# Patient Record
Sex: FEMALE | Race: WHITE | NOT HISPANIC OR LATINO | Employment: FULL TIME | URBAN - METROPOLITAN AREA
[De-identification: names, ages, dates, MRNs, and addresses within clinical notes are randomized per-mention and may not be internally consistent; named-entity substitution may affect disease eponyms.]

---

## 2019-06-05 ENCOUNTER — OFFICE VISIT (OUTPATIENT)
Dept: OBSTETRICS AND GYNECOLOGY | Facility: CLINIC | Age: 19
End: 2019-06-05
Payer: COMMERCIAL

## 2019-06-05 VITALS — DIASTOLIC BLOOD PRESSURE: 73 MMHG | WEIGHT: 166 LBS | SYSTOLIC BLOOD PRESSURE: 116 MMHG | HEART RATE: 71 BPM

## 2019-06-05 DIAGNOSIS — N92.6 IRREGULAR MENSTRUAL CYCLE: Primary | ICD-10-CM

## 2019-06-05 DIAGNOSIS — N76.0 BACTERIAL VAGINOSIS: ICD-10-CM

## 2019-06-05 DIAGNOSIS — B96.89 BACTERIAL VAGINOSIS: ICD-10-CM

## 2019-06-05 PROCEDURE — 99213 PR OFFICE/OUTPT VISIT, EST, LEVL III, 20-29 MIN: ICD-10-PCS | Mod: S$GLB,,, | Performed by: OBSTETRICS & GYNECOLOGY

## 2019-06-05 PROCEDURE — 99213 OFFICE O/P EST LOW 20 MIN: CPT | Mod: S$GLB,,, | Performed by: OBSTETRICS & GYNECOLOGY

## 2019-06-05 RX ORDER — METRONIDAZOLE 500 MG/1
500 TABLET ORAL EVERY 12 HOURS
Qty: 30 TABLET | Refills: 0 | Status: SHIPPED | OUTPATIENT
Start: 2019-06-05 | End: 2019-06-15

## 2019-06-05 NOTE — PROGRESS NOTES
Subjective:       Patient ID: Rachel Andino is a 18 y.o. female.    Chief Complaint:  Vaginal Bleeding      History of Present Illness  Vag odor and had 3 periods  thsi month.  Light only lasted a day or two.  Has nexplanon.  Usually has monthly cycle.     No other complaitns.      Review of Systems  Review of Systems   Constitutional: Negative for chills and fever.   Respiratory: Negative for shortness of breath.    Cardiovascular: Negative for chest pain.   Gastrointestinal: Negative for abdominal pain, blood in stool, constipation, diarrhea, nausea, vomiting and reflux.   Genitourinary: Positive for menstrual problem, vaginal bleeding and vaginal discharge. Negative for dysmenorrhea, dyspareunia, dysuria, hematuria, hot flashes, menorrhagia, pelvic pain, postcoital bleeding and vaginal dryness.   Musculoskeletal: Negative for arthralgias and joint swelling.   Integumentary:  Negative for rash and hair changes.   Psychiatric/Behavioral: Negative for depression. The patient is not nervous/anxious.            Objective:    Physical Exam:   Constitutional: She appears well-developed and well-nourished. No distress.    HENT:   Head: Normocephalic and atraumatic.    Eyes: Conjunctivae and EOM are normal.    Neck: No tracheal deviation present. No thyromegaly present.    Cardiovascular: Exam reveals no clubbing, no cyanosis and no edema.     Pulmonary/Chest: Effort normal. No respiratory distress.        Abdominal: Soft. She exhibits no distension and no mass. There is no tenderness. There is no rebound and no guarding. No hernia.     Genitourinary: Rectum normal, vagina normal and uterus normal. Pelvic exam was performed with patient supine. There is no rash, tenderness, lesion or injury on the right labia. There is no rash, tenderness, lesion or injury on the left labia. Cervix is normal. Right adnexum displays no mass, no tenderness and no fullness. Left adnexum displays no mass, no tenderness and no fullness.                 Skin: She is not diaphoretic. No cyanosis. Nails show no clubbing.         wet prep +++ clue cells  Assessment:        1. Irregular menstrual cycle    2. Bacterial vaginosis               Plan:      Flagyl  If cycle prob cont- will go back to pills

## 2019-11-27 ENCOUNTER — OFFICE VISIT (OUTPATIENT)
Dept: OBSTETRICS AND GYNECOLOGY | Facility: CLINIC | Age: 19
End: 2019-11-27
Payer: COMMERCIAL

## 2019-11-27 VITALS — WEIGHT: 178 LBS | DIASTOLIC BLOOD PRESSURE: 78 MMHG | SYSTOLIC BLOOD PRESSURE: 123 MMHG | HEART RATE: 80 BPM

## 2019-11-27 DIAGNOSIS — Z71.1 CONCERN ABOUT STD IN FEMALE WITHOUT DIAGNOSIS: Primary | ICD-10-CM

## 2019-11-27 PROCEDURE — 99213 PR OFFICE/OUTPT VISIT, EST, LEVL III, 20-29 MIN: ICD-10-PCS | Mod: S$GLB,,, | Performed by: OBSTETRICS & GYNECOLOGY

## 2019-11-27 PROCEDURE — 99213 OFFICE O/P EST LOW 20 MIN: CPT | Mod: S$GLB,,, | Performed by: OBSTETRICS & GYNECOLOGY

## 2019-11-27 NOTE — PROGRESS NOTES
Subjective:       Patient ID: Rachel Andino is a 19 y.o. female.    Chief Complaint:  STD CHECK      History of Present Illness  HPI  Patient here with no symptoms.  She said her boyfriend told her he was having some, symptoms did not tell her what.  She is unsure of anything to do with this symptoms.  She does have irregular bleeding but she has Nexplanon in place.  She has no abnormal discharge abdominal pain or any other issues at this time    GYN & OB History  No LMP recorded (lmp unknown). Patient has had an implant.   Date of Last Pap: No result found    OB History   No data available       Review of Systems  Review of Systems   Constitutional: Negative for chills and fever.   Respiratory: Negative for shortness of breath.    Cardiovascular: Negative for chest pain.   Gastrointestinal: Negative for abdominal pain, blood in stool, constipation, diarrhea, nausea, vomiting and reflux.   Genitourinary: Negative for dysmenorrhea, dyspareunia, dysuria, hematuria, hot flashes, menorrhagia, menstrual problem, pelvic pain, vaginal bleeding, vaginal discharge, postcoital bleeding and vaginal dryness.   Musculoskeletal: Negative for arthralgias and joint swelling.   Integumentary:  Negative for rash and hair changes.   Psychiatric/Behavioral: Negative for depression. The patient is not nervous/anxious.            Objective:    Physical Exam:   Constitutional: She appears well-developed and well-nourished. No distress.    HENT:   Head: Normocephalic and atraumatic.    Eyes: Conjunctivae and EOM are normal.    Neck: No tracheal deviation present. No thyromegaly present.    Cardiovascular: Exam reveals no clubbing, no cyanosis and no edema.     Pulmonary/Chest: Effort normal. No respiratory distress.        Abdominal: Soft. She exhibits no distension and no mass. There is no tenderness. There is no rebound and no guarding. No hernia.     Genitourinary: Rectum normal, vagina normal and uterus normal. Pelvic exam was  performed with patient supine. There is no rash, tenderness, lesion or injury on the right labia. There is no rash, tenderness, lesion or injury on the left labia. Cervix is normal. Right adnexum displays no mass, no tenderness and no fullness. Left adnexum displays no mass, no tenderness and no fullness.                Skin: She is not diaphoretic. No cyanosis. Nails show no clubbing.         Wet prep was completely normal. No white cells  Assessment:        1. Concern about STD in female without diagnosis               Plan:      Reassurance  rtc if sx develop or for annual

## 2021-01-25 ENCOUNTER — OFFICE VISIT (OUTPATIENT)
Dept: OBSTETRICS AND GYNECOLOGY | Facility: CLINIC | Age: 21
End: 2021-01-25
Payer: COMMERCIAL

## 2021-01-25 VITALS — WEIGHT: 175 LBS | DIASTOLIC BLOOD PRESSURE: 81 MMHG | SYSTOLIC BLOOD PRESSURE: 119 MMHG | HEART RATE: 82 BPM

## 2021-01-25 DIAGNOSIS — Z01.419 ENCOUNTER FOR GYNECOLOGICAL EXAMINATION WITHOUT ABNORMAL FINDING: Primary | ICD-10-CM

## 2021-01-25 PROCEDURE — 11982 REMOVE DRUG IMPLANT DEVICE: CPT | Mod: S$GLB,,, | Performed by: OBSTETRICS & GYNECOLOGY

## 2021-01-25 PROCEDURE — 99395 PREV VISIT EST AGE 18-39: CPT | Mod: 25,S$GLB,, | Performed by: OBSTETRICS & GYNECOLOGY

## 2021-01-25 PROCEDURE — 11982 PR REMOVAL DRUG IMPLANT DEVICE: ICD-10-PCS | Mod: S$GLB,,, | Performed by: OBSTETRICS & GYNECOLOGY

## 2021-01-25 PROCEDURE — 99395 PR PREVENTIVE VISIT,EST,18-39: ICD-10-PCS | Mod: 25,S$GLB,, | Performed by: OBSTETRICS & GYNECOLOGY

## 2021-01-25 RX ORDER — ETONOGESTREL 68 MG/1
IMPLANT SUBCUTANEOUS
COMMUNITY

## 2021-01-27 LAB
CHLAMYDIA: NEGATIVE
GONORRHEA: NEGATIVE
SOURCE: NORMAL
SOURCE: NORMAL
TRICHOMONAS AMPLIFIED: NEGATIVE

## 2021-03-11 ENCOUNTER — OFFICE VISIT (OUTPATIENT)
Dept: OBSTETRICS AND GYNECOLOGY | Facility: CLINIC | Age: 21
End: 2021-03-11
Payer: COMMERCIAL

## 2021-03-11 VITALS — SYSTOLIC BLOOD PRESSURE: 112 MMHG | HEART RATE: 87 BPM | WEIGHT: 174 LBS | DIASTOLIC BLOOD PRESSURE: 72 MMHG

## 2021-03-11 DIAGNOSIS — Z30.46 ENCOUNTER FOR REMOVAL AND REINSERTION OF NEXPLANON: Primary | ICD-10-CM

## 2021-03-11 PROCEDURE — 11983 PR REMOVAL W/ REINSERT DRUG IMPLANT DEVICE: ICD-10-PCS | Mod: S$GLB,,, | Performed by: OBSTETRICS & GYNECOLOGY

## 2021-03-11 PROCEDURE — 99499 UNLISTED E&M SERVICE: CPT | Mod: S$GLB,,, | Performed by: OBSTETRICS & GYNECOLOGY

## 2021-03-11 PROCEDURE — 96372 PR INJECTION,THERAP/PROPH/DIAG2ST, IM OR SUBCUT: ICD-10-PCS | Mod: S$GLB,,, | Performed by: OBSTETRICS & GYNECOLOGY

## 2021-03-11 PROCEDURE — 99499 NO LOS: ICD-10-PCS | Mod: S$GLB,,, | Performed by: OBSTETRICS & GYNECOLOGY

## 2021-03-11 PROCEDURE — 96372 THER/PROPH/DIAG INJ SC/IM: CPT | Mod: S$GLB,,, | Performed by: OBSTETRICS & GYNECOLOGY

## 2021-03-11 PROCEDURE — 11983 REMOVE/INSERT DRUG IMPLANT: CPT | Mod: S$GLB,,, | Performed by: OBSTETRICS & GYNECOLOGY

## 2021-03-29 ENCOUNTER — PATIENT MESSAGE (OUTPATIENT)
Dept: OBSTETRICS AND GYNECOLOGY | Facility: CLINIC | Age: 21
End: 2021-03-29

## 2021-03-30 ENCOUNTER — PATIENT MESSAGE (OUTPATIENT)
Dept: OBSTETRICS AND GYNECOLOGY | Facility: CLINIC | Age: 21
End: 2021-03-30

## 2021-05-12 ENCOUNTER — PATIENT MESSAGE (OUTPATIENT)
Dept: RESEARCH | Facility: HOSPITAL | Age: 21
End: 2021-05-12

## 2021-07-01 ENCOUNTER — PATIENT MESSAGE (OUTPATIENT)
Dept: ADMINISTRATIVE | Facility: OTHER | Age: 21
End: 2021-07-01

## 2022-01-27 ENCOUNTER — OFFICE VISIT (OUTPATIENT)
Dept: OBSTETRICS AND GYNECOLOGY | Facility: CLINIC | Age: 22
End: 2022-01-27
Payer: COMMERCIAL

## 2022-01-27 VITALS — DIASTOLIC BLOOD PRESSURE: 76 MMHG | SYSTOLIC BLOOD PRESSURE: 124 MMHG | HEART RATE: 86 BPM | WEIGHT: 141 LBS

## 2022-01-27 DIAGNOSIS — Z01.419 ENCOUNTER FOR GYNECOLOGICAL EXAMINATION WITHOUT ABNORMAL FINDING: Primary | ICD-10-CM

## 2022-01-27 PROCEDURE — 99395 PREV VISIT EST AGE 18-39: CPT | Mod: S$GLB,,, | Performed by: OBSTETRICS & GYNECOLOGY

## 2022-01-27 PROCEDURE — 3078F PR MOST RECENT DIASTOLIC BLOOD PRESSURE < 80 MM HG: ICD-10-PCS | Mod: CPTII,S$GLB,, | Performed by: OBSTETRICS & GYNECOLOGY

## 2022-01-27 PROCEDURE — 3074F SYST BP LT 130 MM HG: CPT | Mod: CPTII,S$GLB,, | Performed by: OBSTETRICS & GYNECOLOGY

## 2022-01-27 PROCEDURE — 1159F PR MEDICATION LIST DOCUMENTED IN MEDICAL RECORD: ICD-10-PCS | Mod: CPTII,S$GLB,, | Performed by: OBSTETRICS & GYNECOLOGY

## 2022-01-27 PROCEDURE — 3074F PR MOST RECENT SYSTOLIC BLOOD PRESSURE < 130 MM HG: ICD-10-PCS | Mod: CPTII,S$GLB,, | Performed by: OBSTETRICS & GYNECOLOGY

## 2022-01-27 PROCEDURE — 3078F DIAST BP <80 MM HG: CPT | Mod: CPTII,S$GLB,, | Performed by: OBSTETRICS & GYNECOLOGY

## 2022-01-27 PROCEDURE — 99395 PR PREVENTIVE VISIT,EST,18-39: ICD-10-PCS | Mod: S$GLB,,, | Performed by: OBSTETRICS & GYNECOLOGY

## 2022-01-27 PROCEDURE — 1159F MED LIST DOCD IN RCRD: CPT | Mod: CPTII,S$GLB,, | Performed by: OBSTETRICS & GYNECOLOGY

## 2022-01-27 RX ORDER — DEXTROAMPHETAMINE SACCHARATE, AMPHETAMINE ASPARTATE, DEXTROAMPHETAMINE SULFATE AND AMPHETAMINE SULFATE 5; 5; 5; 5 MG/1; MG/1; MG/1; MG/1
1 TABLET ORAL 2 TIMES DAILY
COMMUNITY
Start: 2021-12-28

## 2022-01-27 NOTE — PROGRESS NOTES
Subjective:       Patient ID: Rachel Andino is a 21 y.o. female.    Chief Complaint:  Well Woman      History of Present Illness  Pt here for gyn annual.  History and past labs reviewed with patient.    Complaints noen      Review of Systems  Review of Systems   Constitutional: Negative for chills and fever.   Respiratory: Negative for shortness of breath.    Cardiovascular: Negative for chest pain.   Gastrointestinal: Negative for abdominal pain, blood in stool, constipation, diarrhea, nausea, vomiting and reflux.   Genitourinary: Negative for dysmenorrhea, dyspareunia, dysuria, hematuria, hot flashes, menorrhagia, menstrual problem, pelvic pain, vaginal bleeding, vaginal discharge, postcoital bleeding and vaginal dryness.   Musculoskeletal: Negative for arthralgias and joint swelling.   Integumentary:  Negative for rash, hair changes, breast mass, nipple discharge and breast skin changes.   Psychiatric/Behavioral: Negative for depression. The patient is not nervous/anxious.    Breast: Negative for asymmetry, lump, mass, nipple discharge and skin changes          Objective:     Vitals:    01/27/22 1310   BP: 124/76   Pulse: 86   Weight: 64 kg (141 lb)       Physical Exam:   Constitutional: She appears well-developed and well-nourished. No distress.    HENT:   Head: Normocephalic and atraumatic.    Eyes: Conjunctivae and EOM are normal.    Neck: No tracheal deviation present. No thyromegaly present.    Cardiovascular: Exam reveals no clubbing, no cyanosis and no edema.     Pulmonary/Chest: Effort normal. No respiratory distress.        Abdominal: Soft. She exhibits no distension and no mass. There is no abdominal tenderness. There is no rebound and no guarding. No hernia.     Genitourinary:    Vagina, uterus and rectum normal.      Pelvic exam was performed with patient supine.   There is no rash, tenderness, lesion or injury on the right labia. There is no rash, tenderness, lesion or injury on the left labia.  Cervix is normal. Right adnexum displays no mass, no tenderness and no fullness. Left adnexum displays no mass, no tenderness and no fullness.                Skin: She is not diaphoretic. No cyanosis. Nails show no clubbing.             Assessment:        1. Encounter for gynecological examination without abnormal finding               Plan:      nayeli giang  Pap utd

## 2022-01-28 LAB
CHLAMYDIA: NEGATIVE
GONORRHEA: NEGATIVE
SOURCE: NORMAL

## 2022-01-31 LAB
SOURCE: NORMAL
TRICHOMONAS AMPLIFIED: NEGATIVE

## 2022-02-04 ENCOUNTER — PATIENT MESSAGE (OUTPATIENT)
Dept: OBSTETRICS AND GYNECOLOGY | Facility: CLINIC | Age: 22
End: 2022-02-04
Payer: COMMERCIAL

## 2022-02-04 DIAGNOSIS — B37.9 YEAST INFECTION: Primary | ICD-10-CM

## 2022-02-04 RX ORDER — FLUCONAZOLE 100 MG/1
150 TABLET ORAL ONCE
Qty: 1 TABLET | Refills: 0 | Status: SHIPPED | OUTPATIENT
Start: 2022-02-04 | End: 2022-02-04

## 2022-02-16 ENCOUNTER — PATIENT MESSAGE (OUTPATIENT)
Dept: OBSTETRICS AND GYNECOLOGY | Facility: CLINIC | Age: 22
End: 2022-02-16
Payer: COMMERCIAL

## 2022-04-26 ENCOUNTER — PATIENT MESSAGE (OUTPATIENT)
Dept: OBSTETRICS AND GYNECOLOGY | Facility: CLINIC | Age: 22
End: 2022-04-26
Payer: COMMERCIAL

## 2024-09-27 ENCOUNTER — HOSPITAL ENCOUNTER (INPATIENT)
Facility: HOSPITAL | Age: 24
LOS: 1 days | Discharge: HOME | DRG: 310 | End: 2024-09-28
Attending: EMERGENCY MEDICINE | Admitting: INTERNAL MEDICINE
Payer: COMMERCIAL

## 2024-09-27 DIAGNOSIS — R07.9 CHEST PAIN: ICD-10-CM

## 2024-09-27 DIAGNOSIS — R00.2 PALPITATIONS: ICD-10-CM

## 2024-09-27 DIAGNOSIS — F90.2 ATTENTION DEFICIT HYPERACTIVITY DISORDER (ADHD), COMBINED TYPE: ICD-10-CM

## 2024-09-27 DIAGNOSIS — I47.10 SVT (SUPRAVENTRICULAR TACHYCARDIA): Primary | ICD-10-CM

## 2024-09-27 DIAGNOSIS — I48.0 PAF (PAROXYSMAL ATRIAL FIBRILLATION): ICD-10-CM

## 2024-09-27 DIAGNOSIS — I48.91 NEW ONSET A-FIB: ICD-10-CM

## 2024-09-27 DIAGNOSIS — I48.91 A-FIB: ICD-10-CM

## 2024-09-27 LAB
ALBUMIN SERPL BCP-MCNC: 4.2 G/DL (ref 3.5–5.2)
ALP SERPL-CCNC: 53 U/L (ref 55–135)
ALT SERPL W/O P-5'-P-CCNC: 11 U/L (ref 10–44)
ANION GAP SERPL CALC-SCNC: 10 MMOL/L (ref 8–16)
AST SERPL-CCNC: 16 U/L (ref 10–40)
BASOPHILS # BLD AUTO: 0.07 K/UL (ref 0–0.2)
BASOPHILS NFR BLD: 0.6 % (ref 0–1.9)
BILIRUB SERPL-MCNC: 0.3 MG/DL (ref 0.1–1)
BNP SERPL-MCNC: 26 PG/ML (ref 0–99)
BUN SERPL-MCNC: 11 MG/DL (ref 6–20)
CALCIUM SERPL-MCNC: 9.7 MG/DL (ref 8.7–10.5)
CHLORIDE SERPL-SCNC: 107 MMOL/L (ref 95–110)
CO2 SERPL-SCNC: 25 MMOL/L (ref 23–29)
CREAT SERPL-MCNC: 0.8 MG/DL (ref 0.5–1.4)
DIFFERENTIAL METHOD BLD: ABNORMAL
EOSINOPHIL # BLD AUTO: 0.2 K/UL (ref 0–0.5)
EOSINOPHIL NFR BLD: 1.9 % (ref 0–8)
ERYTHROCYTE [DISTWIDTH] IN BLOOD BY AUTOMATED COUNT: 12.5 % (ref 11.5–14.5)
EST. GFR  (NO RACE VARIABLE): >60 ML/MIN/1.73 M^2
GLUCOSE SERPL-MCNC: 95 MG/DL (ref 70–110)
HCT VFR BLD AUTO: 43.2 % (ref 37–48.5)
HGB BLD-MCNC: 14.4 G/DL (ref 12–16)
IMM GRANULOCYTES # BLD AUTO: 0.02 K/UL (ref 0–0.04)
IMM GRANULOCYTES NFR BLD AUTO: 0.2 % (ref 0–0.5)
LYMPHOCYTES # BLD AUTO: 4.9 K/UL (ref 1–4.8)
LYMPHOCYTES NFR BLD: 43.4 % (ref 18–48)
MCH RBC QN AUTO: 30 PG (ref 27–31)
MCHC RBC AUTO-ENTMCNC: 33.3 G/DL (ref 32–36)
MCV RBC AUTO: 90 FL (ref 82–98)
MONOCYTES # BLD AUTO: 0.5 K/UL (ref 0.3–1)
MONOCYTES NFR BLD: 4.6 % (ref 4–15)
NEUTROPHILS # BLD AUTO: 5.6 K/UL (ref 1.8–7.7)
NEUTROPHILS NFR BLD: 49.3 % (ref 38–73)
NRBC BLD-RTO: 0 /100 WBC
PLATELET # BLD AUTO: 326 K/UL (ref 150–450)
PMV BLD AUTO: 10.2 FL (ref 9.2–12.9)
POTASSIUM SERPL-SCNC: 4 MMOL/L (ref 3.5–5.1)
PROT SERPL-MCNC: 7.2 G/DL (ref 6–8.4)
RBC # BLD AUTO: 4.8 M/UL (ref 4–5.4)
SODIUM SERPL-SCNC: 142 MMOL/L (ref 136–145)
TROPONIN I SERPL DL<=0.01 NG/ML-MCNC: <0.006 NG/ML (ref 0–0.03)
WBC # BLD AUTO: 11.34 K/UL (ref 3.9–12.7)

## 2024-09-27 PROCEDURE — 93005 ELECTROCARDIOGRAM TRACING: CPT

## 2024-09-27 PROCEDURE — 80053 COMPREHEN METABOLIC PANEL: CPT | Performed by: EMERGENCY MEDICINE

## 2024-09-27 PROCEDURE — 83880 ASSAY OF NATRIURETIC PEPTIDE: CPT | Performed by: EMERGENCY MEDICINE

## 2024-09-27 PROCEDURE — 96374 THER/PROPH/DIAG INJ IV PUSH: CPT

## 2024-09-27 PROCEDURE — 85025 COMPLETE CBC W/AUTO DIFF WBC: CPT | Performed by: EMERGENCY MEDICINE

## 2024-09-27 PROCEDURE — 93010 ELECTROCARDIOGRAM REPORT: CPT | Mod: ,,, | Performed by: INTERNAL MEDICINE

## 2024-09-27 PROCEDURE — 63600175 PHARM REV CODE 636 W HCPCS

## 2024-09-27 PROCEDURE — 93010 ELECTROCARDIOGRAM REPORT: CPT | Mod: 76,,, | Performed by: INTERNAL MEDICINE

## 2024-09-27 PROCEDURE — 25000003 PHARM REV CODE 250: Performed by: EMERGENCY MEDICINE

## 2024-09-27 PROCEDURE — 96375 TX/PRO/DX INJ NEW DRUG ADDON: CPT

## 2024-09-27 PROCEDURE — 84484 ASSAY OF TROPONIN QUANT: CPT | Performed by: EMERGENCY MEDICINE

## 2024-09-27 PROCEDURE — 25000003 PHARM REV CODE 250

## 2024-09-27 PROCEDURE — 96361 HYDRATE IV INFUSION ADD-ON: CPT

## 2024-09-27 RX ORDER — ADENOSINE 3 MG/ML
6 INJECTION INTRAVENOUS
Status: COMPLETED | OUTPATIENT
Start: 2024-09-27 | End: 2024-09-27

## 2024-09-27 RX ORDER — ADENOSINE 3 MG/ML
INJECTION INTRAVENOUS
Status: COMPLETED
Start: 2024-09-27 | End: 2024-09-27

## 2024-09-27 RX ORDER — ASPIRIN 325 MG
325 TABLET ORAL
Status: COMPLETED | OUTPATIENT
Start: 2024-09-27 | End: 2024-09-27

## 2024-09-27 RX ORDER — ADENOSINE 3 MG/ML
6 INJECTION INTRAVENOUS
Status: DISCONTINUED | OUTPATIENT
Start: 2024-09-27 | End: 2024-09-27

## 2024-09-27 RX ORDER — DILTIAZEM HYDROCHLORIDE 5 MG/ML
10 INJECTION INTRAVENOUS
Status: COMPLETED | OUTPATIENT
Start: 2024-09-27 | End: 2024-09-27

## 2024-09-27 RX ORDER — DILTIAZEM HYDROCHLORIDE 5 MG/ML
INJECTION INTRAVENOUS
Status: COMPLETED
Start: 2024-09-27 | End: 2024-09-27

## 2024-09-27 RX ADMIN — SODIUM CHLORIDE 1000 ML: 9 INJECTION, SOLUTION INTRAVENOUS at 11:09

## 2024-09-27 RX ADMIN — DILTIAZEM HYDROCHLORIDE 10 MG: 5 INJECTION INTRAVENOUS at 11:09

## 2024-09-27 RX ADMIN — ASPIRIN 325 MG ORAL TABLET 325 MG: 325 PILL ORAL at 11:09

## 2024-09-27 RX ADMIN — ADENOSINE 6 MG: 3 INJECTION, SOLUTION INTRAVENOUS at 11:09

## 2024-09-27 RX ADMIN — ADENOSINE 6 MG: 3 INJECTION INTRAVENOUS at 11:09

## 2024-09-28 ENCOUNTER — TELEPHONE (OUTPATIENT)
Dept: CARDIOLOGY | Facility: HOSPITAL | Age: 24
End: 2024-09-28
Payer: COMMERCIAL

## 2024-09-28 VITALS
TEMPERATURE: 98 F | WEIGHT: 145.5 LBS | HEIGHT: 62 IN | SYSTOLIC BLOOD PRESSURE: 106 MMHG | RESPIRATION RATE: 18 BRPM | OXYGEN SATURATION: 99 % | BODY MASS INDEX: 26.78 KG/M2 | HEART RATE: 80 BPM | DIASTOLIC BLOOD PRESSURE: 64 MMHG

## 2024-09-28 DIAGNOSIS — I48.0 PAF (PAROXYSMAL ATRIAL FIBRILLATION): ICD-10-CM

## 2024-09-28 DIAGNOSIS — I47.10 SVT (SUPRAVENTRICULAR TACHYCARDIA): Primary | ICD-10-CM

## 2024-09-28 PROBLEM — F90.9 ADHD: Status: ACTIVE | Noted: 2024-09-28

## 2024-09-28 PROBLEM — R07.9 CHEST PAIN: Status: RESOLVED | Noted: 2024-09-28 | Resolved: 2024-09-28

## 2024-09-28 PROBLEM — I48.91 NEW ONSET A-FIB: Status: ACTIVE | Noted: 2024-09-28

## 2024-09-28 PROBLEM — R07.9 CHEST PAIN: Status: ACTIVE | Noted: 2024-09-28

## 2024-09-28 LAB
ALBUMIN SERPL BCP-MCNC: 3.4 G/DL (ref 3.5–5.2)
ALP SERPL-CCNC: 36 U/L (ref 55–135)
ALT SERPL W/O P-5'-P-CCNC: 9 U/L (ref 10–44)
AMPHET+METHAMPHET UR QL: ABNORMAL
AMPHET+METHAMPHET UR QL: ABNORMAL
ANION GAP SERPL CALC-SCNC: 4 MMOL/L (ref 8–16)
AORTIC ROOT ANNULUS: 2.79 CM
ASCENDING AORTA: 2.72 CM
AST SERPL-CCNC: 11 U/L (ref 10–40)
AV INDEX (PROSTH): 1.05
AV MEAN GRADIENT: 6.6 MMHG
AV PEAK GRADIENT: 11.6 MMHG
AV REGURGITATION PRESSURE HALF TIME: 1005.8 MS
AV VALVE AREA BY VELOCITY RATIO: 2.3 CM²
AV VALVE AREA: 3 CM²
AV VELOCITY RATIO: 0.82
B-HCG UR QL: NEGATIVE
B-HCG UR QL: NEGATIVE
BARBITURATES UR QL SCN>200 NG/ML: NEGATIVE
BARBITURATES UR QL SCN>200 NG/ML: NEGATIVE
BASOPHILS # BLD AUTO: 0.05 K/UL (ref 0–0.2)
BASOPHILS NFR BLD: 0.5 % (ref 0–1.9)
BENZODIAZ UR QL SCN>200 NG/ML: NEGATIVE
BENZODIAZ UR QL SCN>200 NG/ML: NEGATIVE
BILIRUB SERPL-MCNC: 0.3 MG/DL (ref 0.1–1)
BSA FOR ECHO PROCEDURE: 1.7 M2
BUN SERPL-MCNC: 12 MG/DL (ref 6–20)
BZE UR QL SCN: NEGATIVE
BZE UR QL SCN: NEGATIVE
CALCIUM SERPL-MCNC: 8.6 MG/DL (ref 8.7–10.5)
CANNABINOIDS UR QL SCN: NEGATIVE
CANNABINOIDS UR QL SCN: NEGATIVE
CHLORIDE SERPL-SCNC: 109 MMOL/L (ref 95–110)
CHOLEST SERPL-MCNC: 100 MG/DL (ref 120–199)
CHOLEST/HDLC SERPL: 2.4 {RATIO} (ref 2–5)
CO2 SERPL-SCNC: 25 MMOL/L (ref 23–29)
CREAT SERPL-MCNC: 0.7 MG/DL (ref 0.5–1.4)
CREAT UR-MCNC: 115.5 MG/DL (ref 15–325)
CREAT UR-MCNC: 117.2 MG/DL (ref 15–325)
CV ECHO LV RWT: 0.46 CM
DIFFERENTIAL METHOD BLD: ABNORMAL
DOP CALC AO PEAK VEL: 1.7 M/S
DOP CALC AO VTI: 28.1 CM
DOP CALC LVOT AREA: 2.8 CM2
DOP CALC LVOT DIAMETER: 1.9 CM
DOP CALC LVOT PEAK VEL: 1.4 M/S
DOP CALC LVOT STROKE VOLUME: 83.6 CM3
DOP CALC RVOT PEAK VEL: 0.93 M/S
DOP CALC RVOT VTI: 18.4 CM
DOP CALCLVOT PEAK VEL VTI: 29.5 CM
E WAVE DECELERATION TIME: 151.13 MSEC
E/A RATIO: 1.59
E/E' RATIO: 5.23 M/S
ECHO LV POSTERIOR WALL: 0.9 CM (ref 0.6–1.1)
EOSINOPHIL # BLD AUTO: 0.2 K/UL (ref 0–0.5)
EOSINOPHIL NFR BLD: 1.9 % (ref 0–8)
ERYTHROCYTE [DISTWIDTH] IN BLOOD BY AUTOMATED COUNT: 12.7 % (ref 11.5–14.5)
EST. GFR  (NO RACE VARIABLE): >60 ML/MIN/1.73 M^2
FRACTIONAL SHORTENING: 35.9 % (ref 28–44)
GLUCOSE SERPL-MCNC: 95 MG/DL (ref 70–110)
HCT VFR BLD AUTO: 36.6 % (ref 37–48.5)
HDLC SERPL-MCNC: 41 MG/DL (ref 40–75)
HDLC SERPL: 41 % (ref 20–50)
HGB BLD-MCNC: 12 G/DL (ref 12–16)
IMM GRANULOCYTES # BLD AUTO: 0.04 K/UL (ref 0–0.04)
IMM GRANULOCYTES NFR BLD AUTO: 0.4 % (ref 0–0.5)
INTERVENTRICULAR SEPTUM: 1.1 CM (ref 0.6–1.1)
IVC DIAMETER: 1.8 CM
IVRT: 53.28 MSEC
LA MAJOR: 4.69 CM
LA MINOR: 4.46 CM
LA WIDTH: 3.1 CM
LDLC SERPL CALC-MCNC: 52.2 MG/DL (ref 63–159)
LEFT ATRIUM SIZE: 2.88 CM
LEFT ATRIUM VOLUME INDEX: 20.8 ML/M2
LEFT ATRIUM VOLUME: 34.7 CM3
LEFT INTERNAL DIMENSION IN SYSTOLE: 2.5 CM (ref 2.1–4)
LEFT VENTRICLE DIASTOLIC VOLUME INDEX: 40.54 ML/M2
LEFT VENTRICLE DIASTOLIC VOLUME: 67.7 ML
LEFT VENTRICLE MASS INDEX: 73.1 G/M2
LEFT VENTRICLE SYSTOLIC VOLUME INDEX: 13.3 ML/M2
LEFT VENTRICLE SYSTOLIC VOLUME: 22.17 ML
LEFT VENTRICULAR INTERNAL DIMENSION IN DIASTOLE: 3.9 CM (ref 3.5–6)
LEFT VENTRICULAR MASS: 122.1 G
LV LATERAL E/E' RATIO: 4.43 M/S
LV SEPTAL E/E' RATIO: 6.38 M/S
LVED V (TEICH): 67.7 ML
LVES V (TEICH): 22.17 ML
LVOT MG: 5.87 MMHG
LVOT MV: 1.19 CM/S
LYMPHOCYTES # BLD AUTO: 3.5 K/UL (ref 1–4.8)
LYMPHOCYTES NFR BLD: 32.7 % (ref 18–48)
MAGNESIUM SERPL-MCNC: 1.8 MG/DL (ref 1.6–2.6)
MCH RBC QN AUTO: 30.2 PG (ref 27–31)
MCHC RBC AUTO-ENTMCNC: 32.8 G/DL (ref 32–36)
MCV RBC AUTO: 92 FL (ref 82–98)
METHADONE UR QL SCN>300 NG/ML: NEGATIVE
METHADONE UR QL SCN>300 NG/ML: NEGATIVE
MONOCYTES # BLD AUTO: 0.5 K/UL (ref 0.3–1)
MONOCYTES NFR BLD: 5 % (ref 4–15)
MV PEAK A VEL: 0.64 M/S
MV PEAK E VEL: 1.02 M/S
MV STENOSIS PRESSURE HALF TIME: 43.83 MS
MV VALVE AREA P 1/2 METHOD: 5.02 CM2
NEUTROPHILS # BLD AUTO: 6.3 K/UL (ref 1.8–7.7)
NEUTROPHILS NFR BLD: 59.5 % (ref 38–73)
NONHDLC SERPL-MCNC: 59 MG/DL
NRBC BLD-RTO: 0 /100 WBC
OHS QRS DURATION: 180 MS
OHS QRS DURATION: 84 MS
OHS QTC CALCULATION: 366 MS
OHS QTC CALCULATION: 419 MS
OPIATES UR QL SCN: NEGATIVE
OPIATES UR QL SCN: NEGATIVE
PCP UR QL SCN>25 NG/ML: NEGATIVE
PCP UR QL SCN>25 NG/ML: NEGATIVE
PISA AR MAX VEL: 3.7 M/S
PISA MRMAX VEL: 2.61 M/S
PISA TR MAX VEL: 1.88 M/S
PLATELET # BLD AUTO: 270 K/UL (ref 150–450)
PMV BLD AUTO: 10.1 FL (ref 9.2–12.9)
POTASSIUM SERPL-SCNC: 4.1 MMOL/L (ref 3.5–5.1)
PROT SERPL-MCNC: 5.7 G/DL (ref 6–8.4)
PV MEAN GRADIENT: 2 MMHG
RA MAJOR: 3.93 CM
RA PRESSURE ESTIMATED: 3 MMHG
RA WIDTH: 1.9 CM
RBC # BLD AUTO: 3.98 M/UL (ref 4–5.4)
RV TB RVSP: 5 MMHG
SODIUM SERPL-SCNC: 138 MMOL/L (ref 136–145)
STJ: 2.85 CM
T4 FREE SERPL-MCNC: 0.91 NG/DL (ref 0.71–1.51)
TDI LATERAL: 0.23 M/S
TDI SEPTAL: 0.16 M/S
TDI: 0.2 M/S
TOXICOLOGY INFORMATION: ABNORMAL
TOXICOLOGY INFORMATION: ABNORMAL
TR MAX PG: 14 MMHG
TR MEAN GRADIENT: 15 MMHG
TRICUSPID ANNULAR PLANE SYSTOLIC EXCURSION: 1.9 CM
TRIGL SERPL-MCNC: 34 MG/DL (ref 30–150)
TROPONIN I SERPL DL<=0.01 NG/ML-MCNC: 0.01 NG/ML (ref 0–0.03)
TSH SERPL DL<=0.005 MIU/L-ACNC: 1.01 UIU/ML (ref 0.4–4)
TV REST PULMONARY ARTERY PRESSURE: 17 MMHG
WBC # BLD AUTO: 10.64 K/UL (ref 3.9–12.7)
Z-SCORE OF LEFT VENTRICULAR DIMENSION IN END DIASTOLE: -1.8
Z-SCORE OF LEFT VENTRICULAR DIMENSION IN END SYSTOLE: -1.15

## 2024-09-28 PROCEDURE — 81025 URINE PREGNANCY TEST: CPT | Mod: 91 | Performed by: INTERNAL MEDICINE

## 2024-09-28 PROCEDURE — 84443 ASSAY THYROID STIM HORMONE: CPT | Performed by: INTERNAL MEDICINE

## 2024-09-28 PROCEDURE — 99223 1ST HOSP IP/OBS HIGH 75: CPT | Mod: ,,, | Performed by: INTERNAL MEDICINE

## 2024-09-28 PROCEDURE — 21400001 HC TELEMETRY ROOM

## 2024-09-28 PROCEDURE — 80307 DRUG TEST PRSMV CHEM ANLYZR: CPT | Performed by: EMERGENCY MEDICINE

## 2024-09-28 PROCEDURE — 63600175 PHARM REV CODE 636 W HCPCS: Performed by: INTERNAL MEDICINE

## 2024-09-28 PROCEDURE — 99285 EMERGENCY DEPT VISIT HI MDM: CPT | Mod: 25

## 2024-09-28 PROCEDURE — 80053 COMPREHEN METABOLIC PANEL: CPT | Performed by: INTERNAL MEDICINE

## 2024-09-28 PROCEDURE — 81025 URINE PREGNANCY TEST: CPT | Performed by: EMERGENCY MEDICINE

## 2024-09-28 PROCEDURE — 84439 ASSAY OF FREE THYROXINE: CPT | Performed by: INTERNAL MEDICINE

## 2024-09-28 PROCEDURE — 80061 LIPID PANEL: CPT | Performed by: INTERNAL MEDICINE

## 2024-09-28 PROCEDURE — 85025 COMPLETE CBC W/AUTO DIFF WBC: CPT | Performed by: INTERNAL MEDICINE

## 2024-09-28 PROCEDURE — 83735 ASSAY OF MAGNESIUM: CPT | Performed by: INTERNAL MEDICINE

## 2024-09-28 PROCEDURE — 80307 DRUG TEST PRSMV CHEM ANLYZR: CPT | Mod: 91 | Performed by: INTERNAL MEDICINE

## 2024-09-28 PROCEDURE — 84484 ASSAY OF TROPONIN QUANT: CPT | Performed by: EMERGENCY MEDICINE

## 2024-09-28 PROCEDURE — 36415 COLL VENOUS BLD VENIPUNCTURE: CPT | Performed by: EMERGENCY MEDICINE

## 2024-09-28 RX ORDER — SODIUM CHLORIDE, SODIUM LACTATE, POTASSIUM CHLORIDE, CALCIUM CHLORIDE 600; 310; 30; 20 MG/100ML; MG/100ML; MG/100ML; MG/100ML
INJECTION, SOLUTION INTRAVENOUS CONTINUOUS
Status: DISCONTINUED | OUTPATIENT
Start: 2024-09-28 | End: 2024-09-28

## 2024-09-28 RX ORDER — GLUCAGON 1 MG
1 KIT INJECTION
Status: DISCONTINUED | OUTPATIENT
Start: 2024-09-28 | End: 2024-09-28 | Stop reason: HOSPADM

## 2024-09-28 RX ORDER — ACETAMINOPHEN 325 MG/1
650 TABLET ORAL EVERY 6 HOURS PRN
Status: DISCONTINUED | OUTPATIENT
Start: 2024-09-28 | End: 2024-09-28 | Stop reason: HOSPADM

## 2024-09-28 RX ORDER — IBUPROFEN 200 MG
24 TABLET ORAL
Status: DISCONTINUED | OUTPATIENT
Start: 2024-09-28 | End: 2024-09-28 | Stop reason: HOSPADM

## 2024-09-28 RX ORDER — ENOXAPARIN SODIUM 100 MG/ML
1 INJECTION SUBCUTANEOUS ONCE
Status: DISCONTINUED | OUTPATIENT
Start: 2024-09-28 | End: 2024-09-28

## 2024-09-28 RX ORDER — ENOXAPARIN SODIUM 100 MG/ML
1 INJECTION SUBCUTANEOUS ONCE
Status: COMPLETED | OUTPATIENT
Start: 2024-09-28 | End: 2024-09-28

## 2024-09-28 RX ORDER — NALOXONE HCL 0.4 MG/ML
0.02 VIAL (ML) INJECTION
Status: DISCONTINUED | OUTPATIENT
Start: 2024-09-28 | End: 2024-09-28 | Stop reason: HOSPADM

## 2024-09-28 RX ORDER — METOPROLOL TARTRATE 25 MG/1
25 TABLET, FILM COATED ORAL DAILY PRN
Qty: 30 TABLET | Refills: 0 | Status: SHIPPED | OUTPATIENT
Start: 2024-09-28 | End: 2024-10-28

## 2024-09-28 RX ORDER — SODIUM CHLORIDE 0.9 % (FLUSH) 0.9 %
10 SYRINGE (ML) INJECTION EVERY 12 HOURS PRN
Status: DISCONTINUED | OUTPATIENT
Start: 2024-09-28 | End: 2024-09-28 | Stop reason: HOSPADM

## 2024-09-28 RX ORDER — IBUPROFEN 200 MG
16 TABLET ORAL
Status: DISCONTINUED | OUTPATIENT
Start: 2024-09-28 | End: 2024-09-28 | Stop reason: HOSPADM

## 2024-09-28 RX ORDER — ONDANSETRON HYDROCHLORIDE 2 MG/ML
4 INJECTION, SOLUTION INTRAVENOUS EVERY 6 HOURS PRN
Status: DISCONTINUED | OUTPATIENT
Start: 2024-09-28 | End: 2024-09-28 | Stop reason: HOSPADM

## 2024-09-28 RX ADMIN — ENOXAPARIN SODIUM 70 MG: 100 INJECTION SUBCUTANEOUS at 04:09

## 2024-09-28 RX ADMIN — SODIUM CHLORIDE, SODIUM LACTATE, POTASSIUM CHLORIDE, AND CALCIUM CHLORIDE: 600; 310; 30; 20 INJECTION, SOLUTION INTRAVENOUS at 02:09

## 2024-09-28 NOTE — H&P
[facility] Medicine  History & Physical    Patient Name: Rachel Andino  MRN: [id number]  Patient Class: IP- Inpatient  Admission Date: 9/27/2024  Attending Physician:  Lory Edwards MD  Primary Care Provider: Dianelys Primary Doctor         Patient information was obtained from patient, ER records, and ER physician .     Subjective:     Principal Problem:SVT (supraventricular tachycardia)    Chief Complaint:   Chief Complaint   Patient presents with    Palpitations    Chest Pain     Tightness to chest and palpitations x 2 hours pta, denies SOB        HPI: 24-year-old white woman with history of anemia, tachycardia, acne, constipation, ADHD on Adderall, and overweight who presented to the emergency department with complaint of palpitations that occurred 2 hours prior to arrival.  Symptoms were moderate-to-severe in nature, constant, and with no aggravating or alleviating factor identified.  Palpitations began while she was sitting on the couch at rest.  She did have associated chest pain described as tightness and pressure.  She denied any associated shortness a breath, vomiting, diarrhea, fevers, chills.  She had had no recent acute illness.  Patient drinks only rarely.  She denies illicit drugs or tobacco use.  She denies any excessive caffeine use.  Of note, she is on Adderall for ADHD.  Patient denies any prior history of SVT or atrial fibrillation.  On arrival to the emergency department initial EKG showed SVT with a rate of 233.  Patient received adenosine 6 mg IV x1 dose.  Second EKG showed AFib with RVR at 145.  She then received diltiazem 10 mg IV x1 dose.  Third EKG shows atrial fibrillation at 95.  Patient developed relative hypotension with blood pressure of 98/66 and was given a 1 L normal saline IV fluid bolus.      Past Medical History:   Diagnosis Date    Anemia        Past Surgical History:   Procedure Laterality Date    OVARIAN CYST REMOVAL      TONSILLECTOMY,  ADENOIDECTOMY         Review of patient's allergies indicates:   Allergen Reactions    Metal staples [surgical stainless steel]        No current facility-administered medications on file prior to encounter.     Current Outpatient Medications on File Prior to Encounter   Medication Sig    dextroamphetamine-amphetamine (ADDERALL) 20 mg tablet Take 1 tablet by mouth 2 (two) times daily.    etonogestreL (NEXPLANON) 68 mg Impl subdermal device Nexplanon 68 mg subdermal implant     Family History    None       Tobacco Use    Smoking status: Never    Smokeless tobacco: Not on file   Substance and Sexual Activity    Alcohol use: Yes    Drug use: Never    Sexual activity: Yes     Partners: Male     Review of Systems   Constitutional:  Negative for chills and fever.   Respiratory:  Negative for shortness of breath.    Cardiovascular:  Positive for chest pain and palpitations. Negative for leg swelling.   Gastrointestinal:  Negative for diarrhea and vomiting.   All other systems reviewed and are negative.    Objective:     Vital Signs (Most Recent):  Temp: 98.6 °F (37 °C) (09/28/24 0154)  Pulse: 86 (09/28/24 0248)  Resp: 18 (09/28/24 0154)  BP: 94/61 (09/28/24 0154)  SpO2: 96 % (09/28/24 0154) Vital Signs (24h Range):  Temp:  [97.8 °F (36.6 °C)-98.6 °F (37 °C)] 98.6 °F (37 °C)  Pulse:  [] 86  Resp:  [18-22] 18  SpO2:  [96 %-99 %] 96 %  BP: ()/(53-79) 94/61     Weight: 66 kg (145 lb 8.1 oz)  Body mass index is 26.61 kg/m².     Physical Exam  Vitals reviewed.   Constitutional:       General: She is not in acute distress.     Appearance: She is not ill-appearing or diaphoretic.   HENT:      Nose: Nose normal.   Eyes:      Conjunctiva/sclera: Conjunctivae normal.   Cardiovascular:      Rate and Rhythm: Normal rate. Rhythm irregular.   Pulmonary:      Effort: Pulmonary effort is normal. No respiratory distress.   Abdominal:      General: There is no distension.      Tenderness: There is no abdominal tenderness.    Musculoskeletal:         General: No swelling.   Skin:     General: Skin is warm and dry.   Neurological:      Mental Status: She is alert and oriented to person, place, and time.   Psychiatric:         Mood and Affect: Mood normal.         Behavior: Behavior normal.                Significant Labs: All pertinent labs within the past 24 hours have been reviewed.  Recent Lab Results         09/28/24  0216   09/27/24  2319        Benzodiazepines Negative         Methadone metabolites Negative         Phencyclidine Negative         Albumin   4.2       ALP   53       ALT   11       Amphetamines, Urine Presumptive Positive         Anion Gap   10       AST   16       Barbituates, Urine Negative         Baso #   0.07       Basophil %   0.6       BILIRUBIN TOTAL   0.3  Comment: For infants and newborns, interpretation of results should be based  on gestational age, weight and in agreement with clinical  observations.    Premature Infant recommended reference ranges:  Up to 24 hours.............<8.0 mg/dL  Up to 48 hours............<12.0 mg/dL  3-5 days..................<15.0 mg/dL  6-29 days.................<15.0 mg/dL         BNP   26  Comment: Values of less than 100 pg/ml are consistent with non-CHF populations.       BUN   11       Calcium   9.7       Chloride   107       CO2   25       Cocaine, Urine Negative         Creatinine   0.8       Urine Creatinine 117.2         Differential Method   Automated       eGFR   >60       Eos #   0.2       Eos %   1.9       Glucose   95       Gran # (ANC)   5.6       Gran %   49.3       Hematocrit   43.2       Hemoglobin   14.4       Immature Grans (Abs)   0.02  Comment: Mild elevation in immature granulocytes is non specific and   can be seen in a variety of conditions including stress response,   acute inflammation, trauma and pregnancy. Correlation with other   laboratory and clinical findings is essential.         Immature Granulocytes   0.2       Lymph #   4.9       Lymph %    43.4       MCH   30.0       MCHC   33.3       MCV   90       Mono #   0.5       Mono %   4.6       MPV   10.2       nRBC   0       Opiates, Urine Negative         Platelet Count   326       Potassium   4.0       hCG Qualitative, Urine Negative         PROTEIN TOTAL   7.2       RBC   4.80       RDW   12.5       Sodium   142       Marijuana (THC) Metabolite Negative         Toxicology Information SEE COMMENT  Comment: This screen includes the following classes of drugs at the listed   cut-off:    Benzodiazepines 200 ng/ml  Methadone 300 ng/ml  Cocaine metabolite 300 ng/ml  Opiates 300 ng/ml  Barbiturates 200 ng/ml  Amphetamines 1000 ng/ml  Marijuana metabs (THC) 50 ng/ml  Phencyclidine (PCP) 25 ng/ml    This is a screening test. If results do not correlate with clinical   presentation, then a confirmatory send out test is advised.     This report is intended for use in clinical monitoring and management   of   patients. It is not intended for use in employment related drug   testing.           Troponin I   <0.006  Comment: The reference interval for Troponin I represents the 99th percentile   cutoff   for our facility and is consistent with 3rd generation assay   performance.         WBC   11.34               Significant Imaging: I have reviewed all pertinent imaging results/findings within the past 24 hours.  X-Ray Chest 1 View   ED Interpretation   naf         Assessment/Plan:     * SVT (supraventricular tachycardia)  SVT with new onset atrial fibrillation with RVR  -EKG #1 with SVT at 233--> status post adenosine 6 mg IV x1 dose-->EKG #2 with atrial fibrillation with RVR at 145--> status post Cardizem 10 mg IV x1 dose-->EKG #3 with AFib 95--> then with relative hypotension--> status post 1 L normal saline IV fluid bolus--> hemodynamically stable  -white blood cell count 11.34, hemoglobin 14.4, sodium 142, potassium 4.0, creatinine 0.8, BNP 26, troponin negative, UDS positive for amphetamines (patient is on home  regimen of Adderall), hCG negative, BNP 26, troponin negative  -check TSH, free T4, magnesium level  -hold Adderall  -check echocardiogram  -treatment dose Lovenox 70 mg subQ x1 dose given today 09/28/2024 at 1:45 a.m.  -telemetry monitoring  -cardiology consult for a.m.      New onset a-fib  Patient with Paroxysmal (<7 days) atrial fibrillation acute and new onset.  Patient is currently in atrial fibrillation.[id number] Score: 1.  See discussion for SVT as above.  -scheduled AV rachel blockade has not been initiated due to relative hypotension  -patient received a 1 time dose of treatment dose Lovenox 70 mg subQ at 1:45 a.m. today  -cardiology consult    Chest pain  Chest pain likely related to SVT  -EKGs reviewed as above  -troponin negative, BNP 26, UDS negative  -chest x-ray negative by my read and pending final radiology report  -patient received aspirin 325 mg p.o. x1 dose in the emergency department  -check serial cardiac enzymes and fasting lipid panel  -check echocardiogram  -NPO with IV fluids (LR at 100 mL/hr)  -cardiology consult for a.m.      ADHD  Hold Adderall in the setting of new onset AFib with RVR/SVT        VTE Risk Mitigation (From admission, onward)           Ordered     enoxaparin injection 70 mg  Once         09/28/24 0049                                    Loyr Edwards MD  Department of Hospital Medicine  '[place] - Telemetry ([facility])

## 2024-09-28 NOTE — ASSESSMENT & PLAN NOTE
SVT resolved.  Discussed mgt with pt.  Echocardiogram pending.    Needs 2 week outpatient Vital holter and EP consultation asap.  Pt counseled on vagal maneuvers to help tx in future.  Can be given Metoprolol 25 mg script to use only on prn basis if she has high HR; effectiveness of the med is debatable as she likely cannot take a higher dose and may need to go to ER for tx.  Discussed w pt.    May need EP study/ablation in future.    Also avoid caffeine.  Cut Adderall dose back from bid to once daily if possible.

## 2024-09-28 NOTE — ASSESSMENT & PLAN NOTE
A fib resolved.  CHADS-vasc score is 0.  Does not need anticoagulation.  Discussed mgt with pt.  Echocardiogram pending.    Needs 2 week outpatient Vital holter and EP consultation asap.  Pt counseled on vagal maneuvers to help tx in future w SVT episodes.  Can be given Metoprolol 25 mg script to use only on prn basis if she has high HR; effectiveness of the med is debatable as she likely cannot take a higher dose and may need to go to ER for tx.  Discussed w pt.    May need EP study/ablation in future.    Also avoid caffeine.  Cut Adderall dose back from bid to once daily if possible.

## 2024-09-28 NOTE — PLAN OF CARE
Problem: Adult Inpatient Plan of Care  Goal: Plan of Care Review  Outcome: Met  Goal: Patient-Specific Goal (Individualized)  Outcome: Met  Goal: Absence of Hospital-Acquired Illness or Injury  Outcome: Met  Goal: Optimal Comfort and Wellbeing  Outcome: Met  Goal: Readiness for Transition of Care  Outcome: Met     Problem: Fall Injury Risk  Goal: Absence of Fall and Fall-Related Injury  Outcome: Met     Problem: Pain Acute  Goal: Optimal Pain Control and Function  Outcome: Met

## 2024-09-28 NOTE — TELEPHONE ENCOUNTER
Please call pt on Monday 9/0/24.  Needs 2 week Vital Holter asap this week.  Also needs EP consultation asap -- can see Priyanka Alonso NP.      Thanks    Dr Feldman

## 2024-09-28 NOTE — PLAN OF CARE
[facility] - Telemetry (Hospital)  Discharge Final Note    Primary Care Provider: No, Primary Doctor    Expected Discharge Date: 9/28/2024    Final Discharge Note (most recent)       Final Note - 09/28/24 1736          Final Note    Assessment Type Final Discharge Note (P)      Anticipated Discharge Disposition Home or Self Care (P)         Post-Acute Status    Discharge Delays None known at this time (P)                      Important Message from Medicare             Contact Info       No, Primary Doctor   Relationship: PCP - General        Next Steps: Follow up in 1 week(s)        Discharge orders are in.  No other orders for either DME or services.  No needs or discharge delays.

## 2024-09-28 NOTE — HPI
24-year-old white woman with history of anemia, tachycardia, acne, constipation, ADHD on Adderall, and overweight who presented to the emergency department with complaint of palpitations that occurred 2 hours prior to arrival.  Symptoms were moderate-to-severe in nature, constant, and with no aggravating or alleviating factor identified.  Palpitations began while she was sitting on the couch at rest.  She did have associated chest pain described as tightness and pressure.  She denied any associated shortness a breath, vomiting, diarrhea, fevers, chills.  She had had no recent acute illness.  Patient drinks only rarely.  She denies illicit drugs or tobacco use.  She denies any excessive caffeine use.  Of note, she is on Adderall for ADHD.  Patient denies any prior history of SVT or atrial fibrillation.  On arrival to the emergency department initial EKG showed SVT with a rate of 233.  Patient received adenosine 6 mg IV x1 dose.  Second EKG showed AFib with RVR at 145.  She then received diltiazem 10 mg IV x1 dose.  Third EKG shows atrial fibrillation at 95.  Patient developed relative hypotension with blood pressure of 98/66 and was given a 1 L normal saline IV fluid bolus.    
Cardiology consulted for SVT, PAF.  Pt has no prior h/o CV disease.  Is on chronic Adderall for ADDHD.  Also some coffee, energy drinks.  Has had episodic palpitations with high HR noted on Apple watch -- pt thought it was erroneous.  Yesterday admitted w SVT HR > 200 bpm w associated palpitations, chest tightness.  No syncope.  Ecg in ER SVT rate > 200.  Was given Adenosine, then converted to a fib.  Returned to NSR overnight after reviewing telemetry rhythm.  Feels ok now.  - BNP/Troponin.  
Initial (On Arrival)

## 2024-09-28 NOTE — SUBJECTIVE & OBJECTIVE
Past Medical History:   Diagnosis Date    Anemia        Past Surgical History:   Procedure Laterality Date    OVARIAN CYST REMOVAL      TONSILLECTOMY, ADENOIDECTOMY         Review of patient's allergies indicates:   Allergen Reactions    Metal staples [surgical stainless steel]        No current facility-administered medications on file prior to encounter.     Current Outpatient Medications on File Prior to Encounter   Medication Sig    dextroamphetamine-amphetamine (ADDERALL) 20 mg tablet Take 1 tablet by mouth 2 (two) times daily.    etonogestreL (NEXPLANON) 68 mg Impl subdermal device Nexplanon 68 mg subdermal implant     Family History    None       Tobacco Use    Smoking status: Never    Smokeless tobacco: Not on file   Substance and Sexual Activity    Alcohol use: Yes    Drug use: Never    Sexual activity: Yes     Partners: Male     Review of Systems   Constitutional:  Negative for chills and fever.   Respiratory:  Negative for shortness of breath.    Cardiovascular:  Positive for chest pain and palpitations. Negative for leg swelling.   Gastrointestinal:  Negative for diarrhea and vomiting.   All other systems reviewed and are negative.    Objective:     Vital Signs (Most Recent):  Temp: 98.6 °F (37 °C) (09/28/24 0154)  Pulse: 86 (09/28/24 0248)  Resp: 18 (09/28/24 0154)  BP: 94/61 (09/28/24 0154)  SpO2: 96 % (09/28/24 0154) Vital Signs (24h Range):  Temp:  [97.8 °F (36.6 °C)-98.6 °F (37 °C)] 98.6 °F (37 °C)  Pulse:  [] 86  Resp:  [18-22] 18  SpO2:  [96 %-99 %] 96 %  BP: ()/(53-79) 94/61     Weight: 66 kg (145 lb 8.1 oz)  Body mass index is 26.61 kg/m².     Physical Exam  Vitals reviewed.   Constitutional:       General: She is not in acute distress.     Appearance: She is not ill-appearing or diaphoretic.   HENT:      Nose: Nose normal.   Eyes:      Conjunctiva/sclera: Conjunctivae normal.   Cardiovascular:      Rate and Rhythm: Normal rate. Rhythm irregular.   Pulmonary:      Effort: Pulmonary  effort is normal. No respiratory distress.   Abdominal:      General: There is no distension.      Tenderness: There is no abdominal tenderness.   Musculoskeletal:         General: No swelling.   Skin:     General: Skin is warm and dry.   Neurological:      Mental Status: She is alert and oriented to person, place, and time.   Psychiatric:         Mood and Affect: Mood normal.         Behavior: Behavior normal.                Significant Labs: All pertinent labs within the past 24 hours have been reviewed.  Recent Lab Results         09/28/24  0216   09/27/24  2319        Benzodiazepines Negative         Methadone metabolites Negative         Phencyclidine Negative         Albumin   4.2       ALP   53       ALT   11       Amphetamines, Urine Presumptive Positive         Anion Gap   10       AST   16       Barbituates, Urine Negative         Baso #   0.07       Basophil %   0.6       BILIRUBIN TOTAL   0.3  Comment: For infants and newborns, interpretation of results should be based  on gestational age, weight and in agreement with clinical  observations.    Premature Infant recommended reference ranges:  Up to 24 hours.............<8.0 mg/dL  Up to 48 hours............<12.0 mg/dL  3-5 days..................<15.0 mg/dL  6-29 days.................<15.0 mg/dL         BNP   26  Comment: Values of less than 100 pg/ml are consistent with non-CHF populations.       BUN   11       Calcium   9.7       Chloride   107       CO2   25       Cocaine, Urine Negative         Creatinine   0.8       Urine Creatinine 117.2         Differential Method   Automated       eGFR   >60       Eos #   0.2       Eos %   1.9       Glucose   95       Gran # (ANC)   5.6       Gran %   49.3       Hematocrit   43.2       Hemoglobin   14.4       Immature Grans (Abs)   0.02  Comment: Mild elevation in immature granulocytes is non specific and   can be seen in a variety of conditions including stress response,   acute inflammation, trauma and pregnancy.  Correlation with other   laboratory and clinical findings is essential.         Immature Granulocytes   0.2       Lymph #   4.9       Lymph %   43.4       MCH   30.0       MCHC   33.3       MCV   90       Mono #   0.5       Mono %   4.6       MPV   10.2       nRBC   0       Opiates, Urine Negative         Platelet Count   326       Potassium   4.0       hCG Qualitative, Urine Negative         PROTEIN TOTAL   7.2       RBC   4.80       RDW   12.5       Sodium   142       Marijuana (THC) Metabolite Negative         Toxicology Information SEE COMMENT  Comment: This screen includes the following classes of drugs at the listed   cut-off:    Benzodiazepines 200 ng/ml  Methadone 300 ng/ml  Cocaine metabolite 300 ng/ml  Opiates 300 ng/ml  Barbiturates 200 ng/ml  Amphetamines 1000 ng/ml  Marijuana metabs (THC) 50 ng/ml  Phencyclidine (PCP) 25 ng/ml    This is a screening test. If results do not correlate with clinical   presentation, then a confirmatory send out test is advised.     This report is intended for use in clinical monitoring and management   of   patients. It is not intended for use in employment related drug   testing.           Troponin I   <0.006  Comment: The reference interval for Troponin I represents the 99th percentile   cutoff   for our facility and is consistent with 3rd generation assay   performance.         WBC   11.34               Significant Imaging: I have reviewed all pertinent imaging results/findings within the past 24 hours.  X-Ray Chest 1 View   ED Interpretation   naf

## 2024-09-28 NOTE — ED PROVIDER NOTES
SCRIBE #1 NOTE: I, Pita Clayton, am scribing for, and in the presence of, Mitch Harris Jr., MD. I have scribed the entire note.       History     Chief Complaint   Patient presents with    Palpitations    Chest Pain     Tightness to chest and palpitations x 2 hours pta, denies SOB     Review of patient's allergies indicates:   Allergen Reactions    Metal staples [surgical stainless steel]          History of Present Illness     HPI    9/27/2024, 11:19 PM  History obtained from the patient      History of Present Illness: Rachel Andino is a 24 y.o. female patient who presents to the Emergency Department for evaluation of constant, moderate palpitations which onset 2 hours PTA while at rest. HR 220s-230s on initial evaluation. No mitigating or exacerbating factors reported. The patient denies any cardiopulmonary Hx. She denies caffeine use in excess. She denies alcohol or recreational drug use. Associated sxs include chest tightness. Patient denies any CP, SOB, and all other sxs at this time. No prior Tx reported. No further complaints or concerns at this time.       Arrival mode: Personal vehicle    PCP: No, Primary Doctor        Past Medical History:  Past Medical History:   Diagnosis Date    Anemia        Past Surgical History:  Past Surgical History:   Procedure Laterality Date    OVARIAN CYST REMOVAL      TONSILLECTOMY, ADENOIDECTOMY           Family History:  No family history on file.    Social History:  Social History     Tobacco Use    Smoking status: Never    Smokeless tobacco: Not on file   Substance and Sexual Activity    Alcohol use: Yes    Drug use: Never    Sexual activity: Yes     Partners: Male        Review of Systems     Review of Systems   Constitutional:  Negative for fever.   HENT:  Negative for sore throat.    Respiratory:  Positive for chest tightness. Negative for shortness of breath.    Cardiovascular:  Positive for palpitations. Negative for chest pain.   Gastrointestinal:  Negative for  nausea.   Genitourinary:  Negative for dysuria.   Musculoskeletal:  Negative for back pain.   Skin:  Negative for rash.   Neurological:  Negative for weakness.   Hematological:  Does not bruise/bleed easily.   All other systems reviewed and are negative.     Physical Exam     Initial Vitals [09/27/24 2252]   BP Pulse Resp Temp SpO2   114/79 (!) 230 20 97.8 °F (36.6 °C) 99 %      MAP       --          Physical Exam  Nursing Notes and Vital Signs Reviewed.  Constitutional: Patient is in moderate distress. Well-developed and well-nourished.  Head: Atraumatic. Normocephalic.  Eyes: PERRL. EOM intact. Conjunctivae are not pale. No scleral icterus.  ENT: Mucous membranes are moist. Oropharynx is clear and symmetric.    Neck: Supple. Full ROM. No lymphadenopathy.  Cardiovascular: Tachycardic. Regular rhythm. No murmurs, rubs, or gallops. Distal pulses are 2+ and symmetric.  Pulmonary/Chest: No respiratory distress. Clear to auscultation bilaterally. No wheezing or rales.  Abdominal: Soft and non-distended.  There is no tenderness.  No rebound, guarding, or rigidity. Good bowel sounds.  Genitourinary: No CVA tenderness  Musculoskeletal: Moves all extremities. No obvious deformities. No edema. No calf tenderness.  Skin: Warm and dry.  Neurological:  Alert, awake, and appropriate.  Normal speech.  No acute focal neurological deficits are appreciated.  Psychiatric: Normal affect. Good eye contact. Appropriate in content.     ED Course   Critical Care    Date/Time: 9/27/2024 11:27 PM    Performed by: Mitch Harris Jr., MD  Authorized by: Mitch Harris Jr., MD  Direct patient critical care time: 35 minutes  Ordering / reviewing critical care time: 20 minutes  Documentation critical care time: 8 minutes  Consulting other physicians critical care time: 12 minutes  Total critical care time (exclusive of procedural time) : 75 minutes  Critical care time was exclusive of separately billable procedures and treating other patients  "and teaching time.  Critical care was necessary to treat or prevent imminent or life-threatening deterioration of the following conditions: tachycardia requiring Adenosine, atrial fibrillation with rapid ventricular response.  Critical care was time spent personally by me on the following activities: blood draw for specimens, development of treatment plan with patient or surrogate, discussions with consultants, evaluation of patient's response to treatment, interpretation of cardiac output measurements, examination of patient, obtaining history from patient or surrogate, ordering and performing treatments and interventions, ordering and review of laboratory studies, ordering and review of radiographic studies, pulse oximetry, re-evaluation of patient's condition and review of old charts.        ED Vital Signs:  Vitals:    09/27/24 2252 09/27/24 2347 09/28/24 0046 09/28/24 0154   BP: 114/79 98/66 (!) 113/53 94/61   Pulse: (!) 230 96 (!) 112 87   Resp: 20 (!) 22 20 18   Temp: 97.8 °F (36.6 °C)   98.6 °F (37 °C)   TempSrc: Oral   Oral   SpO2: 99% 99% 99% 96%   Weight: 66 kg (145 lb 8.1 oz)      Height: 5' 2" (1.575 m)       09/28/24 0248 09/28/24 0407 09/28/24 0428 09/28/24 0715   BP:  (!) 87/50 (!) 95/55 95/67   Pulse: 86 75 70 82   Resp:  18  18   Temp:  97.9 °F (36.6 °C)  97.8 °F (36.6 °C)   TempSrc:  Oral  Axillary   SpO2:  98%  97%   Weight:       Height:        09/28/24 0808 09/28/24 0925 09/28/24 1116 09/28/24 1330   BP:   106/64    Pulse: 72 70 68 80   Resp:   18    Temp:   98.4 °F (36.9 °C)    TempSrc:   Oral    SpO2:   99%    Weight:       Height:           Abnormal Lab Results:  Labs Reviewed   CBC W/ AUTO DIFFERENTIAL - Abnormal       Result Value    WBC 11.34      RBC 4.80      Hemoglobin 14.4      Hematocrit 43.2      MCV 90      MCH 30.0      MCHC 33.3      RDW 12.5      Platelets 326      MPV 10.2      Immature Granulocytes 0.2      Gran # (ANC) 5.6      Immature Grans (Abs) 0.02      Lymph # 4.9 (*)  "    Mono # 0.5      Eos # 0.2      Baso # 0.07      nRBC 0      Gran % 49.3      Lymph % 43.4      Mono % 4.6      Eosinophil % 1.9      Basophil % 0.6      Differential Method Automated     COMPREHENSIVE METABOLIC PANEL - Abnormal    Sodium 142      Potassium 4.0      Chloride 107      CO2 25      Glucose 95      BUN 11      Creatinine 0.8      Calcium 9.7      Total Protein 7.2      Albumin 4.2      Total Bilirubin 0.3      Alkaline Phosphatase 53 (*)     AST 16      ALT 11      eGFR >60      Anion Gap 10     TROPONIN I    Troponin I <0.006     B-TYPE NATRIURETIC PEPTIDE    BNP 26          All Lab Results:  Results for orders placed or performed during the hospital encounter of 09/27/24   EKG 12-lead    Collection Time: 09/27/24 10:51 PM   Result Value Ref Range    QRS Duration 180 ms    [facility] QTC Calculation 366 ms   EKG 12-lead    Collection Time: 09/27/24 11:00 PM   Result Value Ref Range    QRS Duration 84 ms    [facility] QTC Calculation 419 ms   CBC auto differential    Collection Time: 09/27/24 11:19 PM   Result Value Ref Range    WBC 11.34 3.90 - 12.70 K/uL    RBC 4.80 4.00 - 5.40 M/uL    Hemoglobin 14.4 12.0 - 16.0 g/dL    Hematocrit 43.2 37.0 - 48.5 %    MCV 90 82 - 98 fL    MCH 30.0 27.0 - 31.0 pg    MCHC 33.3 32.0 - 36.0 g/dL    RDW 12.5 11.5 - 14.5 %    Platelets 326 150 - 450 K/uL    MPV 10.2 9.2 - 12.9 fL    Immature Granulocytes 0.2 0.0 - 0.5 %    Gran # (ANC) 5.6 1.8 - 7.7 K/uL    Immature Grans (Abs) 0.02 0.00 - 0.04 K/uL    Lymph # 4.9 (H) 1.0 - 4.8 K/uL    Mono # 0.5 0.3 - 1.0 K/uL    Eos # 0.2 0.0 - 0.5 K/uL    Baso # 0.07 0.00 - 0.20 K/uL    nRBC 0 0 /100 WBC    Gran % 49.3 38.0 - 73.0 %    Lymph % 43.4 18.0 - 48.0 %    Mono % 4.6 4.0 - 15.0 %    Eosinophil % 1.9 0.0 - 8.0 %    Basophil % 0.6 0.0 - 1.9 %    Differential Method Automated    Comprehensive metabolic panel    Collection Time: 09/27/24 11:19 PM   Result Value Ref Range    Sodium 142 136 - 145 mmol/L    Potassium 4.0 3.5 - 5.1 mmol/L     Chloride 107 95 - 110 mmol/L    CO2 25 23 - 29 mmol/L    Glucose 95 70 - 110 mg/dL    BUN 11 6 - 20 mg/dL    Creatinine 0.8 0.5 - 1.4 mg/dL    Calcium 9.7 8.7 - 10.5 mg/dL    Total Protein 7.2 6.0 - 8.4 g/dL    Albumin 4.2 3.5 - 5.2 g/dL    Total Bilirubin 0.3 0.1 - 1.0 mg/dL    Alkaline Phosphatase 53 (L) 55 - 135 U/L    AST 16 10 - 40 U/L    ALT 11 10 - 44 U/L    eGFR >60 >60 mL/min/1.73 m^2    Anion Gap 10 8 - 16 mmol/L   Troponin I #1    Collection Time: 09/27/24 11:19 PM   Result Value Ref Range    Troponin I <0.006 0.000 - 0.026 ng/mL   BNP    Collection Time: 09/27/24 11:19 PM   Result Value Ref Range    BNP 26 0 - 99 pg/mL   Pregnancy, urine rapid    Collection Time: 09/28/24  2:16 AM   Result Value Ref Range    Preg Test, Ur Negative    Drug screen panel, in-house    Collection Time: 09/28/24  2:16 AM   Result Value Ref Range    Benzodiazepines Negative Negative    Methadone metabolites Negative Negative    Cocaine (Metab.) Negative Negative    Opiate Scrn, Ur Negative Negative    Barbiturate Screen, Ur Negative Negative    Amphetamine Screen, Ur Presumptive Positive (A) Negative    THC Negative Negative    Phencyclidine Negative Negative    Creatinine, Urine 117.2 15.0 - 325.0 mg/dL    Toxicology Information SEE COMMENT    Comprehensive metabolic panel    Collection Time: 09/28/24  3:12 AM   Result Value Ref Range    Sodium 138 136 - 145 mmol/L    Potassium 4.1 3.5 - 5.1 mmol/L    Chloride 109 95 - 110 mmol/L    CO2 25 23 - 29 mmol/L    Glucose 95 70 - 110 mg/dL    BUN 12 6 - 20 mg/dL    Creatinine 0.7 0.5 - 1.4 mg/dL    Calcium 8.6 (L) 8.7 - 10.5 mg/dL    Total Protein 5.7 (L) 6.0 - 8.4 g/dL    Albumin 3.4 (L) 3.5 - 5.2 g/dL    Total Bilirubin 0.3 0.1 - 1.0 mg/dL    Alkaline Phosphatase 36 (L) 55 - 135 U/L    AST 11 10 - 40 U/L    ALT 9 (L) 10 - 44 U/L    eGFR >60 >60 mL/min/1.73 m^2    Anion Gap 4 (L) 8 - 16 mmol/L   Lipid Panel    Collection Time: 09/28/24  3:12 AM   Result Value Ref Range     Cholesterol 100 (L) 120 - 199 mg/dL    Triglycerides 34 30 - 150 mg/dL    HDL 41 40 - 75 mg/dL    LDL Cholesterol 52.2 (L) 63.0 - 159.0 mg/dL    HDL/Cholesterol Ratio 41.0 20.0 - 50.0 %    Total Cholesterol/HDL Ratio 2.4 2.0 - 5.0    Non-HDL Cholesterol 59 mg/dL   TSH    Collection Time: 09/28/24  3:13 AM   Result Value Ref Range    TSH 1.009 0.400 - 4.000 uIU/mL   T4, free    Collection Time: 09/28/24  3:13 AM   Result Value Ref Range    Free T4 0.91 0.71 - 1.51 ng/dL   Magnesium    Collection Time: 09/28/24  3:13 AM   Result Value Ref Range    Magnesium 1.8 1.6 - 2.6 mg/dL   Troponin I #2    Collection Time: 09/28/24  3:13 AM   Result Value Ref Range    Troponin I 0.013 0.000 - 0.026 ng/mL   CBC Auto Differential    Collection Time: 09/28/24  3:13 AM   Result Value Ref Range    WBC 10.64 3.90 - 12.70 K/uL    RBC 3.98 (L) 4.00 - 5.40 M/uL    Hemoglobin 12.0 12.0 - 16.0 g/dL    Hematocrit 36.6 (L) 37.0 - 48.5 %    MCV 92 82 - 98 fL    MCH 30.2 27.0 - 31.0 pg    MCHC 32.8 32.0 - 36.0 g/dL    RDW 12.7 11.5 - 14.5 %    Platelets 270 150 - 450 K/uL    MPV 10.1 9.2 - 12.9 fL    Immature Granulocytes 0.4 0.0 - 0.5 %    Gran # (ANC) 6.3 1.8 - 7.7 K/uL    Immature Grans (Abs) 0.04 0.00 - 0.04 K/uL    Lymph # 3.5 1.0 - 4.8 K/uL    Mono # 0.5 0.3 - 1.0 K/uL    Eos # 0.2 0.0 - 0.5 K/uL    Baso # 0.05 0.00 - 0.20 K/uL    nRBC 0 0 /100 WBC    Gran % 59.5 38.0 - 73.0 %    Lymph % 32.7 18.0 - 48.0 %    Mono % 5.0 4.0 - 15.0 %    Eosinophil % 1.9 0.0 - 8.0 %    Basophil % 0.5 0.0 - 1.9 %    Differential Method Automated    Drug screen panel, in-house    Collection Time: 09/28/24  9:06 AM   Result Value Ref Range    Benzodiazepines Negative Negative    Methadone metabolites Negative Negative    Cocaine (Metab.) Negative Negative    Opiate Scrn, Ur Negative Negative    Barbiturate Screen, Ur Negative Negative    Amphetamine Screen, Ur Presumptive Positive (A) Negative    THC Negative Negative    Phencyclidine Negative Negative     Creatinine, Urine 115.5 15.0 - 325.0 mg/dL    Toxicology Information SEE COMMENT    Pregnancy, urine rapid    Collection Time: 09/28/24  9:07 AM   Result Value Ref Range    Preg Test, Ur Negative    Echo    Collection Time: 09/28/24  1:37 PM   Result Value Ref Range    BSA 1.7 m2    LVOT stroke volume 83.6 cm3    LVIDd 3.9 3.5 - 6.0 cm    LV Systolic Volume 22.17 mL    LV Systolic Volume Index 13.3 mL/m2    LVIDs 2.5 2.1 - 4.0 cm    LV Diastolic Volume 67.70 mL    LV Diastolic Volume Index 40.54 mL/m2    Left Ventricular End Systolic Volume by Teichholz Method 22.17 mL    Left Ventricular End Diastolic Volume by Teichholz Method 67.70 mL    IVS 1.1 0.6 - 1.1 cm    LVOT diameter 1.9 cm    LVOT area 2.8 cm2    FS 35.9 28 - 44 %    Left Ventricle Relative Wall Thickness 0.46 cm    PW 0.9 0.6 - 1.1 cm    LV mass 122.1 g    LV Mass Index 73.1 g/m2    MV Peak E Johnny 1.02 m/s    TDI LATERAL 0.23 m/s    TDI SEPTAL 0.16 m/s    E/E' ratio 5.23 m/s    MV Peak A Johnny 0.64 m/s    TR Max Johnny 1.88 m/s    E/A ratio 1.59     IVRT 53.28 msec    E wave deceleration time 151.13 msec    LV SEPTAL E/E' RATIO 6.38 m/s    LV LATERAL E/E' RATIO 4.43 m/s    LVOT peak johnny 1.4 m/s    Left Ventricular Outflow Tract Mean Velocity 1.19 cm/s    Left Ventricular Outflow Tract Mean Gradient 5.87 mmHg    RVOT peak VTI 18.4 cm    TAPSE 1.90 cm    LA size 2.88 cm    Left Atrium Minor Axis 4.46 cm    Left Atrium Major Axis 4.69 cm    RA Major Axis 3.93 cm    AV regurgitation pressure 1/2 time 1,[id number] ms    AR Max Johnny 3.70 m/s    AV mean gradient 6.6 mmHg    AV peak gradient 11.6 mmHg    Ao peak johnny 1.7 m/s    Ao VTI 28.1 cm    LVOT peak VTI 29.5 cm    AV valve area 3.0 cm²    AV Velocity Ratio 0.82     AV index (prosthetic) 1.05     GOOD by Velocity Ratio 2.3 cm²    Mr max johnny 2.61 m/s    MV stenosis pressure 1/2 time 43.83 ms    MV valve area p 1/2 method 5.02 cm2    TV mean gradient 15 mmHg    Triscuspid Valve Regurgitation Peak Gradient 14  mmHg    PV mean gradient 2 mmHg    RVOT peak domenico 0.93 m/s    Ao root annulus 2.79 cm    STJ 2.85 cm    Ascending aorta 2.72 cm    IVC diameter 1.80 cm    Mean e' 0.20 m/s    ZLVIDS -1.15     ZLVIDD -1.80     LORE 20.8 mL/m2    LA Vol 34.70 cm3    LA WIDTH 3.1 cm    RA Width 1.9 cm    TV resting pulmonary artery pressure 17 mmHg    RV TB RVSP 5 mmHg    Est. RA pres 3 mmHg         Imaging Results:  Imaging Results              X-Ray Chest 1 View (Final result)  Result time 09/28/24 07:36:04      Final result by Jamison Grey MD (09/28/24 07:36:04)                   Impression:      1.  Negative for acute process involving the chest.    2.  Incidental findings as noted above.      Electronically signed by: Jamison Grey MD  Date:    09/28/2024  Time:    07:36               Narrative:    EXAMINATION:  XR CHEST 1 VIEW    CLINICAL HISTORY:  chest pain;    COMPARISON:  No comparison studies are available.    FINDINGS:  EKG leads and other life-saving devices overlie the chest.  The lungs are clear. The cardiac silhouette size is normal. The trachea is midline and the mediastinal width is normal. Negative for focal infiltrate, effusion or pneumothorax. Pulmonary vasculature is normal. Negative for osseous abnormalities. Tortuous aorta.  Cardiophrenic fat pads.                        Wet Read by Mitch Harris Jr., MD (09/28/24 00:19:14, '[place] - Emergency Dept., Emergency Medicine)    naf                                     The EKG was ordered, reviewed, and independently interpreted by the ED provider.  Interpretation time: 22:51  Rate: 233 BPM  Rhythm: Wide QRS tachycardia  Interpretation: Right bundle branch block. No STEMI.    Interpretation time: 23:00  Rate: 145 BPM  Rhythm: Atrial fibrillation with rapid ventricular response  Interpretation: No STEMI.    Interpretation time: 23:33  Rate: 95 BPM  Rhythm: Atrial fibrillation  Interpretation: No STEMI.             The Emergency Provider reviewed the vital signs and  test results, which are outlined above.     ED Discussion     11:24 PM: Initial EKG shows HR of 230s. Gave 6 mg IV Adenosine at 23:15. Repeat EKG shows atrial fibrillation with rapid ventricular response with a HR of 145. Will order 10 mg IV Diltiazem.     12:25 AM: Discussed case with Lory Edwards MD ([facility]). Dr. Edwards agrees with current care and management of pt and accepts admission. Dr. Edwards recommends starting treatment dose Lovenox.   Admitting Service: [facility]  Admitting Physician: Dr. Edwards  Admit to: Inpatient Tele    12:25 AM: Re-evaluated pt. I have discussed test results, shared treatment plan, and the need for admission with patient and family at bedside. Pt and family express understanding at this time and agree with all information. All questions answered. Pt and family have no further questions or concerns at this time. Pt is ready for admit.         Medical Decision Making  Amount and/or Complexity of Data Reviewed  Labs: ordered. Decision-making details documented in ED Course.  Radiology: ordered and independent interpretation performed. Decision-making details documented in ED Course.  ECG/medicine tests: ordered and independent interpretation performed. Decision-making details documented in ED Course.    Risk  OTC drugs.  Prescription drug management.  Parenteral controlled substances.  Decision regarding hospitalization.  Risk Details: OTC drugs, prescription drugs and controlled substances considered.  Due to patient's symptoms improving and pain controlled pain medications ordered appropriately.  DDX: MI, CAD, PUlmonary disease, PE, AAA, Pneumonia, Costochondritis, PTX, Liver disease, Pancreatitis      Critical Care  Total time providing critical care: 75 minutes                ED Medication(s):  Medications   diltiaZEM injection 10 mg (10 mg Intravenous Given 9/27/24 2310)   adenosine injection 6 mg (6 mg Intravenous Given 9/27/24 2316)   sodium chloride 0.9%  bolus 1,000 mL 1,000 mL (0 mLs Intravenous Stopped 9/28/24 0016)   aspirin tablet 325 mg (325 mg Oral Given 9/27/24 2320)   enoxaparin injection 70 mg (70 mg Subcutaneous Given 9/28/24 2984)       Discharge Medication List as of 9/28/2024  2:12 PM        START taking these medications    Details   metoprolol tartrate (LOPRESSOR) 25 MG tablet Take 1 tablet (25 mg total) by mouth daily as needed (symptomatic tachycardia)., Starting Sat 9/28/2024, Until Mon 10/28/2024 at 2359, Normal              Follow-up Information       No, Primary Doctor Follow up in 1 week(s).                                 Scribe Attestation:   Scribe #1: I performed the above scribed service and the documentation accurately describes the services I performed. I attest to the accuracy of the note.     Attending:   Physician Attestation Statement for Scribe #1: I, Mitch Harris Jr., MD, personally performed the services described in this documentation, as scribed by Pita Clayton, in my presence, and it is both accurate and complete.           Clinical Impression       ICD-10-CM ICD-9-CM   1. SVT (supraventricular tachycardia)  I47.10 427.89   2. Palpitations  R00.2 785.1   3. New onset a-fib  I48.91 427.31   4. Chest pain  R07.9 786.50   5. A-fib  I48.91 427.31   6. PAF (paroxysmal atrial fibrillation)  I48.0 427.31   7. Attention deficit hyperactivity disorder (ADHD), combined type  F90.2 314.01       Disposition:   Disposition: Admitted  Condition: Mitch Ballard Jr., MD  09/29/24 6713

## 2024-09-28 NOTE — HOSPITAL COURSE
Patient is a 24-year-old female past medical history of ADHD.  She also drank some coffee and energy drinks.  She presented with heart rate greater than 200 and was given adenosine which then converted to AFib and overnight converted to sinus rhythm.  This has happened in the past as she seen in her Apple watch but she felt it was an error.  Laboratory exam negative including normal TSH, T4, troponin, hemoglobin at 12, BNP normal.  Seen in consultation by Cardiology where she was given instruction on vagal maneuvers as well as metoprolol 25 mg script to use p.r.n. she has a high heart rate however, it may not be able to tolerate due to blood pressure.  Echocardiogram  revealed:    Left Ventricle: The left ventricle is normal in size. Normal wall thickness. There is normal systolic function with a visually estimated ejection fraction of 60 - 65%. There is normal diastolic function.    Right Ventricle: Normal right ventricular cavity size. Wall thickness is normal. Systolic function is normal.    IVC/SVC: Normal venous pressure at 3 mmHg.    SVT which has resolved.  She has been instructed to avoid caffeine and energy drinks, attempt to convert Adderall to long-acting are only once a day.  She does have an Apple watch and instructed to follow up her which she becomes symptomatic.  If it does not resolve she should come to the emergency department.  She will follow up with a vital link 2 week monitor and EP Cardiology.    Pt seen and examined on day of discharge and stable for dc home.

## 2024-09-28 NOTE — ASSESSMENT & PLAN NOTE
SVT with new onset atrial fibrillation with RVR  -EKG #1 with SVT at 233--> status post adenosine 6 mg IV x1 dose-->EKG #2 with atrial fibrillation with RVR at 145--> status post Cardizem 10 mg IV x1 dose-->EKG #3 with AFib 95--> then with relative hypotension--> status post 1 L normal saline IV fluid bolus--> hemodynamically stable  -white blood cell count 11.34, hemoglobin 14.4, sodium 142, potassium 4.0, creatinine 0.8, BNP 26, troponin negative, UDS positive for amphetamines (patient is on home regimen of Adderall), hCG negative, BNP 26, troponin negative  -check TSH, free T4, magnesium level  -hold Adderall  -check echocardiogram  -treatment dose Lovenox 70 mg subQ x1 dose given today 09/28/2024 at 1:45 a.m.  -telemetry monitoring  -cardiology consult for a.m.

## 2024-09-28 NOTE — ASSESSMENT & PLAN NOTE
Patient with Paroxysmal (<7 days) atrial fibrillation acute and new onset.  Patient is currently in atrial fibrillation.[id number] Score: 1.  See discussion for SVT as above.  -scheduled AV rachel blockade has not been initiated due to relative hypotension  -patient received a 1 time dose of treatment dose Lovenox 70 mg subQ at 1:45 a.m. today  -cardiology consult

## 2024-09-28 NOTE — ASSESSMENT & PLAN NOTE
Chest pain likely related to SVT  -EKGs reviewed as above  -troponin negative, BNP 26, UDS negative  -chest x-ray negative by my read and pending final radiology report  -patient received aspirin 325 mg p.o. x1 dose in the emergency department  -check serial cardiac enzymes and fasting lipid panel  -check echocardiogram  -NPO with IV fluids (LR at 100 mL/hr)  -cardiology consult for a.m.

## 2024-09-28 NOTE — CONSULTS
[facility] ([facility])  Cardiology  Consult Note    Patient Name: Rachel Andino  MRN: [id number]  Admission Date: 9/27/2024  Hospital Length of Stay: 0 days  Code Status: Full Code   Attending Provider: Ene Onela MD   Consulting Provider: Sj Feldman MD  Primary Care Physician: Dianelys, Primary Doctor  Principal Problem:SVT (supraventricular tachycardia)    Patient information was obtained from patient, past medical records, ER records, and primary team.     Inpatient consult to Cardiology  Consult performed by: Sj Feldman MD  Consult ordered by: Lory Edwards MD  Reason for consult: SVT, atrial fibrilation        Subjective:         HPI:   Cardiology consulted for SVT, PAF.  Pt has no prior h/o CV disease.  Is on chronic Adderall for ADDHD.  Also some coffee, energy drinks.  Has had episodic palpitations with high HR noted on Apple watch -- pt thought it was erroneous.  Yesterday admitted w SVT HR > 200 bpm w associated palpitations, chest tightness.  No syncope.  Ecg in ER SVT rate > 200.  Was given Adenosine, then converted to a fib.  Returned to NSR overnight after reviewing telemetry rhythm.  Feels ok now.  - BNP/Troponin.    Past Medical History:   Diagnosis Date    Anemia        Past Surgical History:   Procedure Laterality Date    OVARIAN CYST REMOVAL      TONSILLECTOMY, ADENOIDECTOMY         Review of patient's allergies indicates:   Allergen Reactions    Metal staples [surgical stainless steel]        No current facility-administered medications on file prior to encounter.     Current Outpatient Medications on File Prior to Encounter   Medication Sig    dextroamphetamine-amphetamine (ADDERALL) 20 mg tablet Take 1 tablet by mouth 2 (two) times daily.    etonogestreL (NEXPLANON) 68 mg Impl subdermal device Nexplanon 68 mg subdermal implant     Family History    None       Tobacco Use    Smoking status: Never    Smokeless tobacco: Not on file   Substance and Sexual Activity     Alcohol use: Yes    Drug use: Never    Sexual activity: Yes     Partners: Male     Review of Systems   Constitutional: Negative.   HENT: Negative.     Eyes: Negative.    Cardiovascular:  Positive for chest pain and palpitations.   Respiratory: Negative.     Endocrine: Negative.    Hematologic/Lymphatic: Negative.    Skin: Negative.    Musculoskeletal: Negative.    Gastrointestinal: Negative.    Genitourinary: Negative.    Neurological: Negative.    Psychiatric/Behavioral: Negative.     Allergic/Immunologic: Negative.      Objective:     Vital Signs (Most Recent):  Temp: 97.8 °F (36.6 °C) (09/28/24 0715)  Pulse: 72 (09/28/24 0808)  Resp: 18 (09/28/24 0715)  BP: 95/67 (09/28/24 0715)  SpO2: 97 % (09/28/24 0715) Vital Signs (24h Range):  Temp:  [97.8 °F (36.6 °C)-98.6 °F (37 °C)] 97.8 °F (36.6 °C)  Pulse:  [] 72  Resp:  [18-22] 18  SpO2:  [96 %-99 %] 97 %  BP: ()/(50-79) 95/67     Weight: 66 kg (145 lb 8.1 oz)  Body mass index is 26.61 kg/m².    SpO2: 97 %         Intake/Output Summary (Last 24 hours) at 9/28/2024 1052  Last data filed at 9/28/2024 0504  Gross per 24 hour   Intake 1276.67 ml   Output 350 ml   Net 926.67 ml       Lines/Drains/Airways       Peripheral Intravenous Line  Duration                  Peripheral IV - Single Lumen 09/27/24 2258 20 G Left Antecubital <1 day                     Physical Exam  Vitals and nursing note reviewed.   Constitutional:       General: She is not in acute distress.     Appearance: Normal appearance. She is well-developed. She is not ill-appearing or diaphoretic.   HENT:      Head: Normocephalic.   Neck:      Thyroid: No thyromegaly.      Vascular: Normal carotid pulses. No carotid bruit, hepatojugular reflux or JVD.   Cardiovascular:      Rate and Rhythm: Normal rate and regular rhythm.      Chest Wall: PMI is not displaced.      Pulses: Normal pulses.           Radial pulses are 2+ on the right side and 2+ on the left side.      Heart sounds: Normal heart  "sounds, S1 normal and S2 normal. No murmur heard.     No friction rub. No gallop.   Pulmonary:      Effort: Pulmonary effort is normal.      Breath sounds: Normal breath sounds. No wheezing or rales.   Abdominal:      General: Bowel sounds are normal. There is no abdominal bruit.      Palpations: Abdomen is soft. There is no hepatomegaly, splenomegaly or mass.      Tenderness: There is no abdominal tenderness.   Musculoskeletal:      Cervical back: Neck supple.   Lymphadenopathy:      Cervical: No cervical adenopathy.   Skin:     General: Skin is warm.   Neurological:      Mental Status: She is alert and oriented to person, place, and time.   Psychiatric:         Behavior: Behavior normal. Behavior is cooperative.          Significant Labs: ABG: No results for input(s): "PH", "PCO2", "HCO3", "POCSATURATED", "BE" in the last 48 hours., Blood Culture: No results for input(s): "LABBLOO" in the last 48 hours., BMP:   Recent Labs   Lab 09/27/24 2319 09/28/24 0312 09/28/24 0313   GLU 95 95  --     138  --    K 4.0 4.1  --     109  --    CO2 25 25  --    BUN 11 12  --    CREATININE 0.8 0.7  --    CALCIUM 9.7 8.6*  --    MG  --   --  1.8   , CMP   Recent Labs   Lab 09/27/24 2319 09/28/24 0312    138   K 4.0 4.1    109   CO2 25 25   GLU 95 95   BUN 11 12   CREATININE 0.8 0.7   CALCIUM 9.7 8.6*   PROT 7.2 5.7*   ALBUMIN 4.2 3.4*   BILITOT 0.3 0.3   ALKPHOS 53* 36*   AST 16 11   ALT 11 9*   ANIONGAP 10 4*   , CBC   Recent Labs   Lab 09/27/24 2319 09/28/24 0313   WBC 11.34 10.64   HGB 14.4 12.0   HCT 43.2 36.6*    270   , INR No results for input(s): "INR", "PROTIME" in the last 48 hours., Lipid Panel   Recent Labs   Lab 09/28/24 0312   CHOL 100*   HDL 41   LDLCALC 52.2*   TRIG 34   CHOLHDL 41.0   , and Troponin   Recent Labs   Lab 09/27/24 2319 09/28/24  0313   TROPONINI <0.006 0.013       Significant Imaging: Echocardiogram: Transthoracic echo (TTE) complete (Cupid Only): No results " found for this or any previous visit.  Assessment and Plan:     * SVT (supraventricular tachycardia)  SVT resolved.  Discussed mgt with pt.  Echocardiogram pending.    Needs 2 week outpatient Vital holter and EP consultation asap.  Pt counseled on vagal maneuvers to help tx in future.  Can be given Metoprolol 25 mg script to use only on prn basis if she has high HR; effectiveness of the med is debatable as she likely cannot take a higher dose and may need to go to ER for tx.  Discussed w pt.    May need EP study/ablation in future.    Also avoid caffeine.  Cut Adderall dose back from bid to once daily if possible.        Chest pain  Due to tachycardia.    New onset a-fib  A fib resolved.  CHADS-vasc score is 0.  Does not need anticoagulation.  Discussed mgt with pt.  Echocardiogram pending.    Needs 2 week outpatient Vital holter and EP consultation asap.  Pt counseled on vagal maneuvers to help tx in future w SVT episodes.  Can be given Metoprolol 25 mg script to use only on prn basis if she has high HR; effectiveness of the med is debatable as she likely cannot take a higher dose and may need to go to ER for tx.  Discussed w pt.    May need EP study/ablation in future.    Also avoid caffeine.  Cut Adderall dose back from bid to once daily if possible.        VTE Risk Mitigation (From admission, onward)      None            Thank you for your consult.     Sj Feldman MD  Cardiology   [facility] - Tele[facility] ([facility])

## 2024-09-28 NOTE — PLAN OF CARE
[facility] Tele[facility] (Hospital)  Initial Discharge Assessment       Primary Care Provider: No, Primary Doctor    Admission Diagnosis: Palpitations [R00.2]  A-fib [I48.91]  New onset a-fib [I48.91]  Chest pain [R07.9]    Admission Date: 9/27/2024  Expected Discharge Date:     Transition of Care Barriers: None    Payor: BLUE CROSS BLUE SHIELD / Plan: BCBS ALL OUT OF STATE / Product Type: PPO /     Extended Emergency Contact Information  Primary Emergency Contact: Ale Andino  Mobile Phone: [phone]  Relation: Mother   needed? No  Secondary Emergency Contact: Jesus Andino  Mobile Phone: [phone]  Relation: Father   needed? No    Discharge Plan A: Home         [id number] - [place], [place] - [address] [facility]  [address] [facility] [address]  Phone: [phone] Fax: [phone]      Initial Assessment (most recent)       Adult Discharge Assessment - 09/28/24 1123          Discharge Assessment    Assessment Type Discharge Planning Assessment     Confirmed/corrected address, phone number and insurance Yes     Confirmed Demographics Correct on Facesheet     Source of Information patient     Does patient/caregiver understand observation status Yes     Communicated VERONIKA with patient/caregiver Date not available/Unable to determine     People in Home significant other     Do you expect to return to your current living situation? Yes     Do you have help at home or someone to help you manage your care at home? No     Prior to hospitilization cognitive status: Alert/Oriented     Current cognitive status: Alert/Oriented     Walking or Climbing Stairs Difficulty no     Dressing/Bathing Difficulty no     Equipment Currently Used at Home none     Readmission within 30 days? No     Patient currently being followed by outpatient case management? No     Do you currently have service(s) that help you manage your care at home? No     Do you take prescription  medications? Yes     Do you have prescription coverage? Yes     Do you have any problems affording any of your prescribed medications? No     Is the patient taking medications as prescribed? yes     Who is going to help you get home at discharge? family     How do you get to doctors appointments? car, drives self     Are you on dialysis? No     Discharge Plan A Home     DME Needed Upon Discharge  none     Discharge Plan discussed with: Patient     Transition of Care Barriers None

## 2024-09-28 NOTE — SUBJECTIVE & OBJECTIVE
Past Medical History:   Diagnosis Date    Anemia        Past Surgical History:   Procedure Laterality Date    OVARIAN CYST REMOVAL      TONSILLECTOMY, ADENOIDECTOMY         Review of patient's allergies indicates:   Allergen Reactions    Metal staples [surgical stainless steel]        No current facility-administered medications on file prior to encounter.     Current Outpatient Medications on File Prior to Encounter   Medication Sig    dextroamphetamine-amphetamine (ADDERALL) 20 mg tablet Take 1 tablet by mouth 2 (two) times daily.    etonogestreL (NEXPLANON) 68 mg Impl subdermal device Nexplanon 68 mg subdermal implant     Family History    None       Tobacco Use    Smoking status: Never    Smokeless tobacco: Not on file   Substance and Sexual Activity    Alcohol use: Yes    Drug use: Never    Sexual activity: Yes     Partners: Male     Review of Systems   Constitutional: Negative.   HENT: Negative.     Eyes: Negative.    Cardiovascular:  Positive for chest pain and palpitations.   Respiratory: Negative.     Endocrine: Negative.    Hematologic/Lymphatic: Negative.    Skin: Negative.    Musculoskeletal: Negative.    Gastrointestinal: Negative.    Genitourinary: Negative.    Neurological: Negative.    Psychiatric/Behavioral: Negative.     Allergic/Immunologic: Negative.      Objective:     Vital Signs (Most Recent):  Temp: 97.8 °F (36.6 °C) (09/28/24 0715)  Pulse: 72 (09/28/24 0808)  Resp: 18 (09/28/24 0715)  BP: 95/67 (09/28/24 0715)  SpO2: 97 % (09/28/24 0715) Vital Signs (24h Range):  Temp:  [97.8 °F (36.6 °C)-98.6 °F (37 °C)] 97.8 °F (36.6 °C)  Pulse:  [] 72  Resp:  [18-22] 18  SpO2:  [96 %-99 %] 97 %  BP: ()/(50-79) 95/67     Weight: 66 kg (145 lb 8.1 oz)  Body mass index is 26.61 kg/m².    SpO2: 97 %         Intake/Output Summary (Last 24 hours) at 9/28/2024 1052  Last data filed at 9/28/2024 0504  Gross per 24 hour   Intake 1276.67 ml   Output 350 ml   Net 926.67 ml       Lines/Drains/Airways   "     Peripheral Intravenous Line  Duration                  Peripheral IV - Single Lumen 09/27/24 2258 20 G Left Antecubital <1 day                     Physical Exam  Vitals and nursing note reviewed.   Constitutional:       General: She is not in acute distress.     Appearance: Normal appearance. She is well-developed. She is not ill-appearing or diaphoretic.   HENT:      Head: Normocephalic.   Neck:      Thyroid: No thyromegaly.      Vascular: Normal carotid pulses. No carotid bruit, hepatojugular reflux or JVD.   Cardiovascular:      Rate and Rhythm: Normal rate and regular rhythm.      Chest Wall: PMI is not displaced.      Pulses: Normal pulses.           Radial pulses are 2+ on the right side and 2+ on the left side.      Heart sounds: Normal heart sounds, S1 normal and S2 normal. No murmur heard.     No friction rub. No gallop.   Pulmonary:      Effort: Pulmonary effort is normal.      Breath sounds: Normal breath sounds. No wheezing or rales.   Abdominal:      General: Bowel sounds are normal. There is no abdominal bruit.      Palpations: Abdomen is soft. There is no hepatomegaly, splenomegaly or mass.      Tenderness: There is no abdominal tenderness.   Musculoskeletal:      Cervical back: Neck supple.   Lymphadenopathy:      Cervical: No cervical adenopathy.   Skin:     General: Skin is warm.   Neurological:      Mental Status: She is alert and oriented to person, place, and time.   Psychiatric:         Behavior: Behavior normal. Behavior is cooperative.          Significant Labs: ABG: No results for input(s): "PH", "PCO2", "HCO3", "POCSATURATED", "BE" in the last 48 hours., Blood Culture: No results for input(s): "LABBLOO" in the last 48 hours., BMP:   Recent Labs   Lab 09/27/24  2319 09/28/24  0312 09/28/24  0313   GLU 95 95  --     138  --    K 4.0 4.1  --     109  --    CO2 25 25  --    BUN 11 12  --    CREATININE 0.8 0.7  --    CALCIUM 9.7 8.6*  --    MG  --   --  1.8   , CMP   Recent Labs " "  Lab 09/27/24 2319 09/28/24  0312    138   K 4.0 4.1    109   CO2 25 25   GLU 95 95   BUN 11 12   CREATININE 0.8 0.7   CALCIUM 9.7 8.6*   PROT 7.2 5.7*   ALBUMIN 4.2 3.4*   BILITOT 0.3 0.3   ALKPHOS 53* 36*   AST 16 11   ALT 11 9*   ANIONGAP 10 4*   , CBC   Recent Labs   Lab 09/27/24 2319 09/28/24 0313   WBC 11.34 10.64   HGB 14.4 12.0   HCT 43.2 36.6*    270   , INR No results for input(s): "INR", "PROTIME" in the last 48 hours., Lipid Panel   Recent Labs   Lab 09/28/24 0312   CHOL 100*   HDL 41   LDLCALC 52.2*   TRIG 34   CHOLHDL 41.0   , and Troponin   Recent Labs   Lab 09/27/24 2319 09/28/24 0313   TROPONINI <0.006 0.013       Significant Imaging: Echocardiogram: Transthoracic echo (TTE) complete (Cupid Only): No results found for this or any previous visit.  "

## 2024-09-28 NOTE — DISCHARGE SUMMARY
[facility] Medicine  Discharge Summary      Patient Name: Rachel Andino  MRN: [id number]  [facility]: [id number]  Patient Class: IP- Inpatient  Admission Date: 9/27/2024  Hospital Length of Stay: 0 days  Discharge Date and Time:  09/28/2024 1:53 PM  Attending Physician: Ene Reyes MD   Discharging Provider: Ene Reyes MD  Primary Care Provider: Dianelys, Primary Doctor    Primary Care Team: Networked reference to record PCT     HPI:   24-year-old white woman with history of anemia, tachycardia, acne, constipation, ADHD on Adderall, and overweight who presented to the emergency department with complaint of palpitations that occurred 2 hours prior to arrival.  Symptoms were moderate-to-severe in nature, constant, and with no aggravating or alleviating factor identified.  Palpitations began while she was sitting on the couch at rest.  She did have associated chest pain described as tightness and pressure.  She denied any associated shortness a breath, vomiting, diarrhea, fevers, chills.  She had had no recent acute illness.  Patient drinks only rarely.  She denies illicit drugs or tobacco use.  She denies any excessive caffeine use.  Of note, she is on Adderall for ADHD.  Patient denies any prior history of SVT or atrial fibrillation.  On arrival to the emergency department initial EKG showed SVT with a rate of 233.  Patient received adenosine 6 mg IV x1 dose.  Second EKG showed AFib with RVR at 145.  She then received diltiazem 10 mg IV x1 dose.  Third EKG shows atrial fibrillation at 95.  Patient developed relative hypotension with blood pressure of 98/66 and was given a 1 L normal saline IV fluid bolus.      * No surgery found *      Hospital Course:   Patient is a 24-year-old female past medical history of ADHD.  She also drank some coffee and energy drinks.  She presented with heart rate greater than 200 and was given adenosine which then converted to AFib and overnight  converted to sinus rhythm.  This has happened in the past as she seen in her Apple watch but she felt it was an error.  Laboratory exam negative including normal TSH, T4, troponin, hemoglobin at 12, BNP normal.  Seen in consultation by Cardiology where she was given instruction on vagal maneuvers as well as metoprolol 25 mg script to use p.r.n. she has a high heart rate however, it may not be able to tolerate due to blood pressure.  Echocardiogram  revealed:    Left Ventricle: The left ventricle is normal in size. Normal wall thickness. There is normal systolic function with a visually estimated ejection fraction of 60 - 65%. There is normal diastolic function.    Right Ventricle: Normal right ventricular cavity size. Wall thickness is normal. Systolic function is normal.    IVC/SVC: Normal venous pressure at 3 mmHg.    SVT which has resolved.  She has been instructed to avoid caffeine and energy drinks, attempt to convert Adderall to long-acting are only once a day.  She does have an Apple watch and instructed to follow up her which she becomes symptomatic.  If it does not resolve she should come to the emergency department.  She will follow up with a vital link 2 week monitor and EP Cardiology.    Pt seen and examined on day of discharge and stable for dc home.     Goals of Care Treatment Preferences:  Code Status: Full Code         Consults:   Consults (From admission, onward)          Status Ordering Provider     Inpatient consult to Cardiology  Once        Provider:  Sj Feldman MD    Completed TAMELA LARSEN            No new Assessment & Plan notes have been filed under this hospital service since the last note was generated.  Service: Hospital Medicine    Final Active Diagnoses:    Diagnosis Date Noted POA    PRINCIPAL PROBLEM:  SVT (supraventricular tachycardia) [I47.10] 09/28/2024 Yes    New onset a-fib [I48.91] 09/28/2024 Yes    ADHD [F90.9] 09/28/2024 Yes      Problems Resolved During this  "Admission:    Diagnosis Date Noted Date Resolved POA    Chest pain [R07.9] 09/28/2024 09/28/2024 Yes       Discharged Condition: good    Disposition: Home or Self Care    Follow Up:   Follow-up Information       No, Primary Doctor Follow up in 1 week(s).                           Patient Instructions:      Ambulatory referral/consult to Cardiology   Standing Status: Future   Referral Priority: Routine Referral Type: Consultation   Referral Reason: Specialty Services Required   Requested Specialty: Cardiology   Number of Visits Requested: 1     Ambulatory referral/consult to Cardiac Electrophysiology   Standing Status: Future   Referral Priority: Routine Referral Type: Consultation   Referral Reason: Specialty Services Required   Referred to Provider: LUCIO PAINTER Requested Specialty: Cardiology   Number of Visits Requested: 1     Diet Adult Regular     Notify your health care provider if you experience any of the following:  difficulty breathing or increased cough     Notify your health care provider if you experience any of the following:  persistent dizziness, light-headedness, or visual disturbances     Notify your health care provider if you experience any of the following:  increased confusion or weakness     Notify your health care provider if you experience any of the following:   Order Comments: Sustained tachycardia     Activity as tolerated       Significant Diagnostic Studies: Labs: CMP   Recent Labs   Lab 09/27/24 2319 09/28/24 0312    138   K 4.0 4.1    109   CO2 25 25   GLU 95 95   BUN 11 12   CREATININE 0.8 0.7   CALCIUM 9.7 8.6*   PROT 7.2 5.7*   ALBUMIN 4.2 3.4*   BILITOT 0.3 0.3   ALKPHOS 53* 36*   AST 16 11   ALT 11 9*   ANIONGAP 10 4*   , CBC   Recent Labs   Lab 09/27/24 2319 09/28/24  0313   WBC 11.34 10.64   HGB 14.4 12.0   HCT 43.2 36.6*    270   , INR No results found for: "INR", "PROTIME", Lipid Panel   Lab Results   Component Value Date    CHOL 100 (L) 09/28/2024    " HDL 41 09/28/2024    LDLCALC 52.2 (L) 09/28/2024    TRIG 34 09/28/2024    CHOLHDL 41.0 09/28/2024   , and Troponin   Recent Labs   Lab 09/27/24  2319 09/28/24  0313   TROPONINI <0.006 0.013       Pending Diagnostic Studies:       Procedure Component Value Units Date/Time    EKG 12-lead [[id number]] Collected: 09/27/24 2251    Order Status: Sent Lab Status: In process Updated: 09/28/24 0826     QRS Duration 180 ms      [facility] QTC Calculation 366 ms     Narrative:      Test Reason : I48.0,    Vent. Rate : 233 BPM     Atrial Rate : 000 BPM     P-R Int : 000 ms          QRS Dur : 180 ms      QT Int : 186 ms       P-R-T Axes : 000 087 000 degrees     QTc Int : 366 ms    Wide QRS tachycardia  Right bundle branch block  Abnormal ECG  No previous ECGs available    Referred By: [email or web address]   SELF           Confirmed By:            Medications:  Reconciled Home Medications:      Medication List        START taking these medications      metoprolol tartrate 25 MG tablet  Commonly known as: LOPRESSOR  Take 1 tablet (25 mg total) by mouth daily as needed (symptomatic tachycardia).            CONTINUE taking these medications      dextroamphetamine-amphetamine 20 mg tablet  Commonly known as: ADDERALL  Take 1 tablet by mouth 2 (two) times daily.     NEXPLANON 68 mg Impl intradermal implant  Generic drug: etonogestreL  Nexplanon 68 mg subdermal implant              Indwelling Lines/Drains at time of discharge:   Lines/Drains/Airways       None                   Time spent on the discharge of patient: 32 minutes         Ene Reyes MD  Department of Hospital Medicine  [facility] - Tele[facility] ([facility])

## 2024-10-02 ENCOUNTER — HOSPITAL ENCOUNTER (OUTPATIENT)
Dept: CARDIOLOGY | Facility: HOSPITAL | Age: 24
Discharge: HOME | End: 2024-10-02
Attending: INTERNAL MEDICINE
Payer: COMMERCIAL

## 2024-10-02 ENCOUNTER — HOSPITAL ENCOUNTER (EMERGENCY)
Facility: HOSPITAL | Age: 24
Discharge: HOME | End: 2024-10-03
Attending: EMERGENCY MEDICINE
Payer: COMMERCIAL

## 2024-10-02 ENCOUNTER — NURSE TRIAGE (OUTPATIENT)
Dept: ADMINISTRATIVE | Facility: CLINIC | Age: 24
End: 2024-10-02
Payer: COMMERCIAL

## 2024-10-02 DIAGNOSIS — I48.0 PAF (PAROXYSMAL ATRIAL FIBRILLATION): ICD-10-CM

## 2024-10-02 DIAGNOSIS — I47.10 SVT (SUPRAVENTRICULAR TACHYCARDIA): ICD-10-CM

## 2024-10-02 DIAGNOSIS — I47.10 SVT (SUPRAVENTRICULAR TACHYCARDIA): Primary | ICD-10-CM

## 2024-10-02 PROCEDURE — 96374 THER/PROPH/DIAG INJ IV PUSH: CPT

## 2024-10-02 PROCEDURE — 99285 EMERGENCY DEPT VISIT HI MDM: CPT

## 2024-10-02 PROCEDURE — 96375 TX/PRO/DX INJ NEW DRUG ADDON: CPT

## 2024-10-02 RX ORDER — ADENOSINE 3 MG/ML
INJECTION INTRAVENOUS
Status: COMPLETED
Start: 2024-10-02 | End: 2024-10-03

## 2024-10-03 ENCOUNTER — TELEPHONE (OUTPATIENT)
Dept: CARDIOLOGY | Facility: CLINIC | Age: 24
End: 2024-10-03
Payer: COMMERCIAL

## 2024-10-03 VITALS
SYSTOLIC BLOOD PRESSURE: 99 MMHG | BODY MASS INDEX: 26.65 KG/M2 | WEIGHT: 144.81 LBS | RESPIRATION RATE: 20 BRPM | HEIGHT: 62 IN | OXYGEN SATURATION: 100 % | HEART RATE: 65 BPM | TEMPERATURE: 98 F | DIASTOLIC BLOOD PRESSURE: 61 MMHG

## 2024-10-03 LAB
ALBUMIN SERPL BCP-MCNC: 4.4 G/DL (ref 3.5–5.2)
ALP SERPL-CCNC: 61 U/L (ref 55–135)
ALT SERPL W/O P-5'-P-CCNC: 46 U/L (ref 10–44)
ANION GAP SERPL CALC-SCNC: 12 MMOL/L (ref 8–16)
AST SERPL-CCNC: 38 U/L (ref 10–40)
BASOPHILS # BLD AUTO: 0.09 K/UL (ref 0–0.2)
BASOPHILS NFR BLD: 0.7 % (ref 0–1.9)
BILIRUB SERPL-MCNC: 0.4 MG/DL (ref 0.1–1)
BNP SERPL-MCNC: 20 PG/ML (ref 0–99)
BUN SERPL-MCNC: 13 MG/DL (ref 6–20)
CALCIUM SERPL-MCNC: 9.6 MG/DL (ref 8.7–10.5)
CHLORIDE SERPL-SCNC: 105 MMOL/L (ref 95–110)
CO2 SERPL-SCNC: 22 MMOL/L (ref 23–29)
CREAT SERPL-MCNC: 0.8 MG/DL (ref 0.5–1.4)
DIFFERENTIAL METHOD BLD: NORMAL
EOSINOPHIL # BLD AUTO: 0.2 K/UL (ref 0–0.5)
EOSINOPHIL NFR BLD: 1.7 % (ref 0–8)
ERYTHROCYTE [DISTWIDTH] IN BLOOD BY AUTOMATED COUNT: 12.6 % (ref 11.5–14.5)
EST. GFR  (NO RACE VARIABLE): >60 ML/MIN/1.73 M^2
GLUCOSE SERPL-MCNC: 117 MG/DL (ref 70–110)
HCT VFR BLD AUTO: 41.7 % (ref 37–48.5)
HCV AB SERPL QL IA: NEGATIVE
HEP C VIRUS HOLD SPECIMEN: NORMAL
HGB BLD-MCNC: 13.9 G/DL (ref 12–16)
HIV 1+2 AB+HIV1 P24 AG SERPL QL IA: NEGATIVE
IMM GRANULOCYTES # BLD AUTO: 0.03 K/UL (ref 0–0.04)
IMM GRANULOCYTES NFR BLD AUTO: 0.2 % (ref 0–0.5)
LYMPHOCYTES # BLD AUTO: 4.7 K/UL (ref 1–4.8)
LYMPHOCYTES NFR BLD: 39.2 % (ref 18–48)
MAGNESIUM SERPL-MCNC: 2.2 MG/DL (ref 1.6–2.6)
MCH RBC QN AUTO: 30.2 PG (ref 27–31)
MCHC RBC AUTO-ENTMCNC: 33.3 G/DL (ref 32–36)
MCV RBC AUTO: 91 FL (ref 82–98)
MONOCYTES # BLD AUTO: 0.8 K/UL (ref 0.3–1)
MONOCYTES NFR BLD: 6.7 % (ref 4–15)
NEUTROPHILS # BLD AUTO: 6.2 K/UL (ref 1.8–7.7)
NEUTROPHILS NFR BLD: 51.5 % (ref 38–73)
NRBC BLD-RTO: 0 /100 WBC
OHS QRS DURATION: 88 MS
OHS QTC CALCULATION: 406 MS
PLATELET # BLD AUTO: 314 K/UL (ref 150–450)
PMV BLD AUTO: 10.1 FL (ref 9.2–12.9)
POTASSIUM SERPL-SCNC: 3.8 MMOL/L (ref 3.5–5.1)
PROT SERPL-MCNC: 7.6 G/DL (ref 6–8.4)
RBC # BLD AUTO: 4.61 M/UL (ref 4–5.4)
SODIUM SERPL-SCNC: 139 MMOL/L (ref 136–145)
TROPONIN I SERPL DL<=0.01 NG/ML-MCNC: <0.006 NG/ML (ref 0–0.03)
TSH SERPL DL<=0.005 MIU/L-ACNC: 1.68 UIU/ML (ref 0.4–4)
WBC # BLD AUTO: 12.04 K/UL (ref 3.9–12.7)

## 2024-10-03 PROCEDURE — 83880 ASSAY OF NATRIURETIC PEPTIDE: CPT | Performed by: EMERGENCY MEDICINE

## 2024-10-03 PROCEDURE — 25000003 PHARM REV CODE 250: Performed by: EMERGENCY MEDICINE

## 2024-10-03 PROCEDURE — 85025 COMPLETE CBC W/AUTO DIFF WBC: CPT | Performed by: EMERGENCY MEDICINE

## 2024-10-03 PROCEDURE — 83735 ASSAY OF MAGNESIUM: CPT | Performed by: EMERGENCY MEDICINE

## 2024-10-03 PROCEDURE — 84443 ASSAY THYROID STIM HORMONE: CPT | Performed by: EMERGENCY MEDICINE

## 2024-10-03 PROCEDURE — 93005 ELECTROCARDIOGRAM TRACING: CPT

## 2024-10-03 PROCEDURE — 63600175 PHARM REV CODE 636 W HCPCS

## 2024-10-03 PROCEDURE — 84484 ASSAY OF TROPONIN QUANT: CPT | Performed by: EMERGENCY MEDICINE

## 2024-10-03 PROCEDURE — 86803 HEPATITIS C AB TEST: CPT | Performed by: EMERGENCY MEDICINE

## 2024-10-03 PROCEDURE — 80053 COMPREHEN METABOLIC PANEL: CPT | Performed by: EMERGENCY MEDICINE

## 2024-10-03 PROCEDURE — 93010 ELECTROCARDIOGRAM REPORT: CPT | Mod: ,,, | Performed by: INTERNAL MEDICINE

## 2024-10-03 PROCEDURE — 87389 HIV-1 AG W/HIV-1&-2 AB AG IA: CPT | Performed by: EMERGENCY MEDICINE

## 2024-10-03 PROCEDURE — 63600175 PHARM REV CODE 636 W HCPCS: Performed by: EMERGENCY MEDICINE

## 2024-10-03 RX ORDER — ONDANSETRON HYDROCHLORIDE 2 MG/ML
4 INJECTION, SOLUTION INTRAVENOUS
Status: COMPLETED | OUTPATIENT
Start: 2024-10-03 | End: 2024-10-03

## 2024-10-03 RX ORDER — METOPROLOL TARTRATE 50 MG/1
50 TABLET ORAL
Status: COMPLETED | OUTPATIENT
Start: 2024-10-03 | End: 2024-10-03

## 2024-10-03 RX ORDER — ADENOSINE 3 MG/ML
6 INJECTION INTRAVENOUS
Status: COMPLETED | OUTPATIENT
Start: 2024-10-03 | End: 2024-10-03

## 2024-10-03 RX ORDER — MORPHINE SULFATE 4 MG/ML
4 INJECTION, SOLUTION INTRAMUSCULAR; INTRAVENOUS
Status: COMPLETED | OUTPATIENT
Start: 2024-10-03 | End: 2024-10-03

## 2024-10-03 RX ADMIN — ADENOSINE 6 MG: 3 INJECTION, SOLUTION INTRAVENOUS at 12:10

## 2024-10-03 RX ADMIN — ONDANSETRON 4 MG: 2 INJECTION INTRAMUSCULAR; INTRAVENOUS at 12:10

## 2024-10-03 RX ADMIN — MORPHINE SULFATE 4 MG: 4 INJECTION INTRAVENOUS at 12:10

## 2024-10-03 RX ADMIN — ADENOSINE 6 MG: 3 INJECTION INTRAVENOUS at 12:10

## 2024-10-03 RX ADMIN — METOPROLOL TARTRATE 50 MG: 50 TABLET, FILM COATED ORAL at 12:10

## 2024-10-03 NOTE — ED PROVIDER NOTES
SCRIBE #1 NOTE: I, Adrian Rdz, am scribing for, and in the presence of, Joaquin Valadez Jr., MD. I have scribed the HPI, ROS, and PEx.    SCRIBE #2 NOTE: I, John Mcguire, am scribing for, and in the presence of,  Michi Cardenas MD. I have scribed the remaining portions of the note not scribed by Scribe #1.      History     Chief Complaint   Patient presents with    Palpitations     Pt presents to the ER with external heart recorder for frequent rapid heart rate.  Pt denies chest pain but reports palpitations that woke her up around 2230.  -N/-V/-D. Pt reports she took 25 mg Metoprolol 1 hr PTA with no improvement.      Review of patient's allergies indicates:   Allergen Reactions    Metal staples [surgical stainless steel]          History of Present Illness     HPI    10/3/2024, 12:04 AM  History obtained from the patient      History of Present Illness: Rachel Andino is a 24 y.o. female patient with a PMHx of Anemia who presents to the Emergency Department for evaluation of Palpitations which onset gradually 10:30 PM. Pt's external heart recorder alerted for rapid heart rate. Pt's palpitations woke her up at 10:30. Symptoms are constant and moderate in severity. No mitigating or exacerbating factors reported. No associated sxs. Patient denies any chest pain and all other sxs at this time. Prior Tx includes 25 mg Metoprolol 1 hr PTA. No further complaints or concerns at this time.       Arrival mode: Personal vehicle    PCP: No, Primary Doctor        Past Medical History:  Past Medical History:   Diagnosis Date    ADHD     Anemia     SVT (supraventricular tachycardia)        Past Surgical History:  Past Surgical History:   Procedure Laterality Date    OVARIAN CYST REMOVAL      TONSILLECTOMY, ADENOIDECTOMY           Family History:  No family history on file.    Social History:  Social History     Tobacco Use    Smoking status: Never    Smokeless tobacco: Not on file   Substance and Sexual Activity    Alcohol use:  Yes    Drug use: Never    Sexual activity: Yes     Partners: Male        Review of Systems     Review of Systems   Constitutional:  Negative for fever.   HENT:  Negative for sore throat.    Respiratory:  Negative for shortness of breath.    Cardiovascular:  Positive for palpitations. Negative for chest pain.   Gastrointestinal:  Negative for nausea.   Genitourinary:  Negative for dysuria.   Musculoskeletal:  Negative for back pain.   Skin:  Negative for rash.   Neurological:  Negative for weakness.   Hematological:  Does not bruise/bleed easily.   All other systems reviewed and are negative.       Physical Exam     Initial Vitals   BP Pulse Resp Temp SpO2   10/02/24 2344 10/01/24 2354 10/02/24 2344 10/02/24 2344 10/02/24 2344   128/67 88 (!) 22 97.7 °F (36.5 °C) 98 %      MAP       --                 Physical Exam  Nursing Notes and Vital Signs Reviewed.  Constitutional: Patient is in no acute distress. Well-developed and well-nourished.  Head: Atraumatic. Normocephalic.  Eyes:  EOM intact.  No scleral icterus.  ENT: Mucous membranes are moist.  Nares clear   Neck:  Full ROM. No JVD.  Cardiovascular: Regular rate. Regular rhythm No murmurs, rubs, or gallops. Distal pulses are 2+ and symmetric  Pulmonary/Chest: No respiratory distress. Clear to auscultation bilaterally. No wheezing or rales.  Equal chest wall rise bilaterally  Abdominal: Soft and non-distended.  There is no tenderness.  No rebound, guarding, or rigidity. Good bowel sounds.  Genitourinary: No CVA tenderness.  No suprapubic tenderness  Musculoskeletal: Moves all extremities. No obvious deformities.  5 x 5 strength in all extremities   Skin: Warm and dry.  Neurological:  Alert, awake, and appropriate.  Normal speech.  No acute focal neurological deficits are appreciated.  Two through 12 intact bilaterally.  Psychiatric: Normal affect. Good eye contact. Appropriate in content.    ED Course   Critical Care    Date/Time: 10/3/2024 1:36 AM    Performed by:  "Joaquin Valadez Jr., MD  Authorized by: Joaquin Valadez Jr., MD  Direct patient critical care time: 10 minutes  Additional history critical care time: 5 minutes  Ordering / reviewing critical care time: 5 minutes  Documentation critical care time: 5 minutes  Consulting other physicians critical care time: 5 minutes  Consult with family critical care time: 5 minutes  Total critical care time (exclusive of procedural time) : 35 minutes  Critical care time was exclusive of teaching time and separately billable procedures and treating other patients.  Critical care was necessary to treat or prevent imminent or life-threatening deterioration of the following conditions: cardiac failure.  Critical care was time spent personally by me on the following activities: blood draw for specimens, development of treatment plan with patient or surrogate, discussions with consultants, interpretation of cardiac output measurements, evaluation of patient's response to treatment, examination of patient, obtaining history from patient or surrogate, ordering and performing treatments and interventions, review of old charts, re-evaluation of patient's condition, pulse oximetry, ordering and review of radiographic studies and ordering and review of laboratory studies.        ED Vital Signs:  Vitals:    10/01/24 2354 10/02/24 2344 10/02/24 2353 10/03/24 0000   BP:  128/67  109/87   Pulse: 88  (!) 215 (!) 208   Resp:  (!) 22  19   Temp:  97.7 °F (36.5 °C)     TempSrc:  Oral     SpO2:  98%  100%   Weight:  65.7 kg (144 lb 13.5 oz)     Height:  5' 2" (1.575 m)      10/03/24 0006 10/03/24 0009 10/03/24 0030 10/03/24 0100   BP:   105/78 99/60   Pulse: 88 81 75 72   Resp:  18 20 20   Temp:       TempSrc:       SpO2:  100% 100% 99%   Weight:       Height:        10/03/24 0200 10/03/24 0230 10/03/24 0300   BP: 91/60 91/61 99/61   Pulse: 69 67 65   Resp: 20 20 20   Temp:   98 °F (36.7 °C)   TempSrc:   Oral   SpO2: 100% 100% 100%   Weight:    "   Height:          Abnormal Lab Results:  Labs Reviewed   COMPREHENSIVE METABOLIC PANEL - Abnormal       Result Value    Sodium 139      Potassium 3.8      Chloride 105      CO2 22 (*)     Glucose 117 (*)     BUN 13      Creatinine 0.8      Calcium 9.6      Total Protein 7.6      Albumin 4.4      Total Bilirubin 0.4      Alkaline Phosphatase 61      AST 38      ALT 46 (*)     eGFR >60      Anion Gap 12      Narrative:     Release to patient->Immediate   HIV 1 / 2 ANTIBODY    HIV 1/2 Ag/Ab Negative      Narrative:     Release to patient->Immediate   HEPATITIS C ANTIBODY    Hepatitis C Ab Negative      Narrative:     Release to patient->Immediate   HEP C VIRUS HOLD SPECIMEN    HEP C Virus Hold Specimen Hold for HCV sendout      Narrative:     Release to patient->Immediate   CBC W/ AUTO DIFFERENTIAL    WBC 12.04      RBC 4.61      Hemoglobin 13.9      Hematocrit 41.7      MCV 91      MCH 30.2      MCHC 33.3      RDW 12.6      Platelets 314      MPV 10.1      Immature Granulocytes 0.2      Gran # (ANC) 6.2      Immature Grans (Abs) 0.03      Lymph # 4.7      Mono # 0.8      Eos # 0.2      Baso # 0.09      nRBC 0      Gran % 51.5      Lymph % 39.2      Mono % 6.7      Eosinophil % 1.7      Basophil % 0.7      Differential Method Automated      Narrative:     Release to patient->Immediate   TROPONIN I    Troponin I <0.006      Narrative:     Release to patient->Immediate   B-TYPE NATRIURETIC PEPTIDE    BNP 20      Narrative:     Release to patient->Immediate   TSH    TSH 1.680      Narrative:     Release to patient->Immediate   MAGNESIUM    Magnesium 2.2      Narrative:     Release to patient->Immediate        All Lab Results:  Results for orders placed or performed during the hospital encounter of 10/02/24   HIV 1/2 Ag/Ab (4th Gen)    Collection Time: 10/03/24 12:08 AM   Result Value Ref Range    HIV 1/2 Ag/Ab Negative Negative   Hepatitis C Antibody    Collection Time: 10/03/24 12:08 AM   Result Value Ref Range     Hepatitis C Ab Negative Negative   HCV Virus Hold Specimen    Collection Time: 10/03/24 12:08 AM   Result Value Ref Range    HEP C Virus Hold Specimen Hold for HCV sendout    CBC auto differential    Collection Time: 10/03/24 12:08 AM   Result Value Ref Range    WBC 12.04 3.90 - 12.70 K/uL    RBC 4.61 4.00 - 5.40 M/uL    Hemoglobin 13.9 12.0 - 16.0 g/dL    Hematocrit 41.7 37.0 - 48.5 %    MCV 91 82 - 98 fL    MCH 30.2 27.0 - 31.0 pg    MCHC 33.3 32.0 - 36.0 g/dL    RDW 12.6 11.5 - 14.5 %    Platelets 314 150 - 450 K/uL    MPV 10.1 9.2 - 12.9 fL    Immature Granulocytes 0.2 0.0 - 0.5 %    Gran # (ANC) 6.2 1.8 - 7.7 K/uL    Immature Grans (Abs) 0.03 0.00 - 0.04 K/uL    Lymph # 4.7 1.0 - 4.8 K/uL    Mono # 0.8 0.3 - 1.0 K/uL    Eos # 0.2 0.0 - 0.5 K/uL    Baso # 0.09 0.00 - 0.20 K/uL    nRBC 0 0 /100 WBC    Gran % 51.5 38.0 - 73.0 %    Lymph % 39.2 18.0 - 48.0 %    Mono % 6.7 4.0 - 15.0 %    Eosinophil % 1.7 0.0 - 8.0 %    Basophil % 0.7 0.0 - 1.9 %    Differential Method Automated    Comprehensive metabolic panel    Collection Time: 10/03/24 12:08 AM   Result Value Ref Range    Sodium 139 136 - 145 mmol/L    Potassium 3.8 3.5 - 5.1 mmol/L    Chloride 105 95 - 110 mmol/L    CO2 22 (L) 23 - 29 mmol/L    Glucose 117 (H) 70 - 110 mg/dL    BUN 13 6 - 20 mg/dL    Creatinine 0.8 0.5 - 1.4 mg/dL    Calcium 9.6 8.7 - 10.5 mg/dL    Total Protein 7.6 6.0 - 8.4 g/dL    Albumin 4.4 3.5 - 5.2 g/dL    Total Bilirubin 0.4 0.1 - 1.0 mg/dL    Alkaline Phosphatase 61 55 - 135 U/L    AST 38 10 - 40 U/L    ALT 46 (H) 10 - 44 U/L    eGFR >60 >60 mL/min/1.73 m^2    Anion Gap 12 8 - 16 mmol/L   Troponin I #1    Collection Time: 10/03/24 12:08 AM   Result Value Ref Range    Troponin I <0.006 0.000 - 0.026 ng/mL   BNP    Collection Time: 10/03/24 12:08 AM   Result Value Ref Range    BNP 20 0 - 99 pg/mL   TSH    Collection Time: 10/03/24 12:08 AM   Result Value Ref Range    TSH 1.680 0.400 - 4.000 uIU/mL   Magnesium    Collection Time: 10/03/24  12:08 AM   Result Value Ref Range    Magnesium 2.2 1.6 - 2.6 mg/dL        Imaging Results:  Imaging Results              X-Ray Chest AP Portable (Final result)  Result time 10/03/24 00:56:15      Final result by Parisa Odom MD (10/03/24 00:56:15)                   Impression:      No acute intrathoracic abnormality identified on this single radiographic view of the chest.      Electronically signed by: Parisa Odom MD  Date:    10/03/2024  Time:    00:56               Narrative:    EXAMINATION:  XR CHEST AP PORTABLE    CLINICAL HISTORY:  Chest Pain;    TECHNIQUE:  Single frontal view of the chest was performed.    COMPARISON:  09/27/2024    FINDINGS:  Cardiac monitoring leads overlie the chest.  External cardiac monitoring device projects over the upper left hemithorax.  The cardiomediastinal silhouette is unchanged in size and configuration.  Mediastinal structures are midline.  The lungs appear symmetrically expanded without definite evidence of confluent airspace consolidation, significant volume of pleural fluid or pneumothorax.  Visualized osseous structures are intact.                                     1ST  The EKG was ordered, reviewed, and independently interpreted by the ED provider.  Interpretation time: 11:49  Rate: 210 BPM  Rhythm: supraventricular tachycardia (SVT)  Interpretation: ST & T wave abnormality, consider inferior ischemia. No STEMI.  2ND  The EKG was ordered, reviewed, and independently interpreted by the ED provider.  Interpretation time: 12:03  Rate: 90 BPM  Rhythm: normal sinus rhythm  Interpretation: No acute ST changes. No STEMI.    The Emergency Provider reviewed the vital signs and test results, which are outlined above.     ED Discussion     2:00 AM: Dr. Valadez transfers care of patient to Dr. Cardenas pending troponin results.     2:57 AM: Reassessed pt at this time. Discussed with pt all pertinent ED information and results. Discussed pt dx and plan of tx. Gave pt all f/u and  return to the ED instructions. All questions and concerns were addressed at this time. Pt expresses understanding of information and instructions, and is comfortable with plan to discharge. Pt is stable for discharge.    I discussed with patient and/or family/caretaker that evaluation in the ED does not suggest any emergent or life threatening medical conditions requiring immediate intervention beyond what was provided in the ED, and I believe patient is safe for discharge.  Regardless, an unremarkable evaluation in the ED does not preclude the development or presence of a serious of life threatening condition. As such, patient was instructed to return immediately for any worsening or change in current symptoms.           Medical Decision Making  Amount and/or Complexity of Data Reviewed  Labs: ordered. Decision-making details documented in ED Course.  Radiology: ordered. Decision-making details documented in ED Course.  ECG/medicine tests: ordered and independent interpretation performed. Decision-making details documented in ED Course.    Risk  Prescription drug management.  Parenteral controlled substances.                ED Medication(s):  Medications   metoprolol tartrate (LOPRESSOR) tablet 50 mg (50 mg Oral Given 10/3/24 0009)   ondansetron injection 4 mg (4 mg Intravenous Given 10/3/24 0009)   morphine injection 4 mg (4 mg Intravenous Given 10/3/24 0009)   adenosine injection 6 mg (6 mg Intravenous Given 10/3/24 0001)       Discharge Medication List as of 10/3/2024  2:58 AM           Follow-up Information       [facility] In 1 day.    Specialty: Cardiology  Contact information:  [id number] [facility] [id number] [phone]             [facility] - Emergency Dept..    Specialty: Emergency Medicine  Why: As needed, If symptoms worsen  Contact information:  [id number] [facility] [id number] [phone]                               Scribe Attestation:    Scribe #1: I performed the above scribed service and the documentation accurately describes the services I performed. I attest to the accuracy of the note.     Attending:   Physician Attestation Statement for Scribe #1: I, Joaquin Valadez Jr., MD, personally performed the services described in this documentation, as scribed by Adrian Rdz, in my presence, and it is both accurate and complete.       Scribe Attestation:   Scribe #2: I performed the above scribed service and the documentation accurately describes the services I performed. I attest to the accuracy of the note.    Attending Attestation:           Physician Attestation for Scribe:    Physician Attestation Statement for Scribe #2: I, Michi Cardenas MD., reviewed documentation, as scribed by John Mcguire in my presence, and it is both accurate and complete. I also acknowledge and confirm the content of the note done by Scribe #1.           Clinical Impression       ICD-10-CM ICD-9-CM   1. SVT (supraventricular tachycardia)  I47.10 427.89       Disposition:   Disposition: Discharged  Condition: Stable        Michi Cardenas MD  10/03/24 0531

## 2024-10-03 NOTE — TELEPHONE ENCOUNTER
Pt had a heart monitor put on earlier today. Seen in ED Friday night for tachycardia. Pt just woke up from her sleeping because she felt like her heart is racing and her apple watch states it is currently 210. Took a metoprolol at 10:45 but it has not helped yet. Pt has tried bearing down, splashing cold water in her face, etc as recommended by provider with no relief.      Dispo- go to ED now. Pt advised and VU.  Reason for Disposition   [1] Heart beating very rapidly (e.g., > 140 / minute) AND [2] present now  (Exception: During exercise.)    Additional Information   Negative: Passed out (e.g., fainted, lost consciousness, blacked out and was not responding)   Negative: Shock suspected (e.g., cold/pale/clammy skin, too weak to stand, low BP, rapid pulse)   Negative: Difficult to awaken or acting confused (e.g., disoriented, slurred speech)   Negative: Visible sweat on face or sweat dripping down face   Negative: Unable to walk, or can only walk with assistance (e.g., requires support)   Negative: [1] Received SHOCK from implantable cardiac defibrillator AND [2] persisting symptoms (i.e., palpitations, lightheadedness)   Negative: [1] Dizziness, lightheadedness, or weakness AND [2] heart beating very rapidly (e.g., > 140 / minute)   Negative: [1] Feeling weak or lightheaded (e.g., woozy, feeling like they might faint) AND [2] heart beating very slowly (e.g., < 50 / minute)   Negative: Sounds like a life-threatening emergency to the triager   Negative: Difficulty breathing   Negative: Feeling weak or lightheaded (e.g., woozy, feeling like they might faint)    Protocols used: Heart Rate and Heartbeat [id number]

## 2024-10-03 NOTE — TELEPHONE ENCOUNTER
Contacted PT and she has been released and she has her monitor on and will wait for apt that is scheduled

## 2024-10-14 ENCOUNTER — TELEPHONE (OUTPATIENT)
Dept: CARDIOLOGY | Facility: CLINIC | Age: 24
End: 2024-10-14
Payer: COMMERCIAL

## 2024-10-14 NOTE — TELEPHONE ENCOUNTER
Patient wanted to confirm to go ahead and use the extra VC to finish out her time needed for monitor.----- Message from Ale sent at 10/14/2024  7:39 AM CDT -----  Type:  Needs Medical Advice    Who Called: pt  Symptoms (please be specific): na   How long has patient had these symptoms:  na  Pharmacy name and phone #:  na  Would the patient rather a call back or a response via [facility]? call  Best Call Back Number: [phone]    Additional Information: requesting to speak with nurse regarding Holter monitor

## 2024-10-28 DIAGNOSIS — I47.10 SVT (SUPRAVENTRICULAR TACHYCARDIA): Primary | ICD-10-CM

## 2024-10-28 DIAGNOSIS — I48.0 PAF (PAROXYSMAL ATRIAL FIBRILLATION): ICD-10-CM

## 2024-10-29 ENCOUNTER — HOSPITAL ENCOUNTER (OUTPATIENT)
Dept: CARDIOLOGY | Facility: HOSPITAL | Age: 24
Discharge: HOME | End: 2024-10-29
Payer: COMMERCIAL

## 2024-10-29 ENCOUNTER — OFFICE VISIT (OUTPATIENT)
Dept: CARDIOLOGY | Facility: CLINIC | Age: 24
End: 2024-10-29
Payer: COMMERCIAL

## 2024-10-29 VITALS
BODY MASS INDEX: 26.65 KG/M2 | WEIGHT: 144.81 LBS | DIASTOLIC BLOOD PRESSURE: 74 MMHG | HEART RATE: 90 BPM | HEIGHT: 62 IN | SYSTOLIC BLOOD PRESSURE: 120 MMHG | OXYGEN SATURATION: 99 %

## 2024-10-29 DIAGNOSIS — F90.2 ATTENTION DEFICIT HYPERACTIVITY DISORDER (ADHD), COMBINED TYPE: ICD-10-CM

## 2024-10-29 DIAGNOSIS — R00.2 PALPITATIONS: ICD-10-CM

## 2024-10-29 DIAGNOSIS — I47.10 SVT (SUPRAVENTRICULAR TACHYCARDIA): ICD-10-CM

## 2024-10-29 DIAGNOSIS — I47.10 SVT (SUPRAVENTRICULAR TACHYCARDIA): Primary | ICD-10-CM

## 2024-10-29 DIAGNOSIS — I48.0 PAF (PAROXYSMAL ATRIAL FIBRILLATION): ICD-10-CM

## 2024-10-29 LAB
OHS QRS DURATION: 82 MS
OHS QTC CALCULATION: 393 MS

## 2024-10-29 PROCEDURE — 3008F BODY MASS INDEX DOCD: CPT | Mod: CPTII,S$GLB,, | Performed by: NURSE PRACTITIONER

## 2024-10-29 PROCEDURE — 93010 ELECTROCARDIOGRAM REPORT: CPT | Mod: ,,, | Performed by: INTERNAL MEDICINE

## 2024-10-29 PROCEDURE — 93005 ELECTROCARDIOGRAM TRACING: CPT

## 2024-10-29 PROCEDURE — 3044F HG A1C LEVEL LT 7.0%: CPT | Mod: CPTII,S$GLB,, | Performed by: NURSE PRACTITIONER

## 2024-10-29 PROCEDURE — 99999 PR PBB SHADOW E&M-EST. PATIENT-LVL IV: CPT | Mod: PBBFAC,,, | Performed by: NURSE PRACTITIONER

## 2024-10-29 PROCEDURE — 1159F MED LIST DOCD IN RCRD: CPT | Mod: CPTII,S$GLB,, | Performed by: NURSE PRACTITIONER

## 2024-10-29 PROCEDURE — 3074F SYST BP LT 130 MM HG: CPT | Mod: CPTII,S$GLB,, | Performed by: NURSE PRACTITIONER

## 2024-10-29 PROCEDURE — 99204 OFFICE O/P NEW MOD 45 MIN: CPT | Mod: S$GLB,,, | Performed by: NURSE PRACTITIONER

## 2024-10-29 PROCEDURE — 3078F DIAST BP <80 MM HG: CPT | Mod: CPTII,S$GLB,, | Performed by: NURSE PRACTITIONER

## 2024-10-29 RX ORDER — DEXTROAMPHETAMINE SACCHARATE, AMPHETAMINE ASPARTATE, DEXTROAMPHETAMINE SULFATE AND AMPHETAMINE SULFATE 7.5; 7.5; 7.5; 7.5 MG/1; MG/1; MG/1; MG/1
1 TABLET ORAL EVERY MORNING
COMMUNITY